# Patient Record
Sex: FEMALE | Race: WHITE | NOT HISPANIC OR LATINO | Employment: FULL TIME | URBAN - METROPOLITAN AREA
[De-identification: names, ages, dates, MRNs, and addresses within clinical notes are randomized per-mention and may not be internally consistent; named-entity substitution may affect disease eponyms.]

---

## 2018-02-08 ENCOUNTER — RESULT FOLLOW UP (OUTPATIENT)
Dept: OBGYN | Facility: CLINIC | Age: 43
End: 2018-02-08

## 2018-02-08 ENCOUNTER — OFFICE VISIT (OUTPATIENT)
Dept: OBGYN | Facility: CLINIC | Age: 43
End: 2018-02-08
Payer: COMMERCIAL

## 2018-02-08 VITALS
OXYGEN SATURATION: 100 % | HEIGHT: 63 IN | WEIGHT: 128.4 LBS | BODY MASS INDEX: 22.75 KG/M2 | SYSTOLIC BLOOD PRESSURE: 104 MMHG | DIASTOLIC BLOOD PRESSURE: 60 MMHG | HEART RATE: 69 BPM

## 2018-02-08 DIAGNOSIS — Z13.29 SCREENING FOR THYROID DISORDER: ICD-10-CM

## 2018-02-08 DIAGNOSIS — Z13.220 SCREENING FOR LIPOID DISORDERS: ICD-10-CM

## 2018-02-08 DIAGNOSIS — Z01.419 ENCOUNTER FOR GYNECOLOGICAL EXAMINATION WITHOUT ABNORMAL FINDING: Primary | ICD-10-CM

## 2018-02-08 DIAGNOSIS — Z13.1 SCREENING FOR DIABETES MELLITUS: ICD-10-CM

## 2018-02-08 PROCEDURE — 87624 HPV HI-RISK TYP POOLED RSLT: CPT | Performed by: OBSTETRICS & GYNECOLOGY

## 2018-02-08 PROCEDURE — 99386 PREV VISIT NEW AGE 40-64: CPT | Performed by: OBSTETRICS & GYNECOLOGY

## 2018-02-08 PROCEDURE — G0145 SCR C/V CYTO,THINLAYER,RESCR: HCPCS | Performed by: OBSTETRICS & GYNECOLOGY

## 2018-02-08 RX ORDER — SERTRALINE HYDROCHLORIDE 100 MG/1
100 TABLET, FILM COATED ORAL DAILY
Qty: 90 TABLET | Refills: 3 | COMMUNITY
Start: 2018-02-08 | End: 2022-07-29

## 2018-02-08 NOTE — LETTER
January 25, 2019      Itzel Atkins  [address] [facility] [address]    Dear ,      At [facility], your health and wellness is our primary concern. That is why we are following up on a positive high risk HPV test from 02/08/18. Your provider had recommended that you have a Pap smear and HPV test completed by 02/08/19. Our records do not show that this has been scheduled.    It is important to complete the follow up that your provider has suggested for you to ensure that there are no worsening changes which may, over time, develop into cancer.      Please contact our office at  [phone] to schedule an appointment for a Pap smear and HPV test at your earliest convenience. If you have questions or concerns, please call the clinic and we will be happy to assist you.    If you have completed the tests outside of [facility], please have the results forwarded to our office. We will update the chart for your primary Physician to review before your next annual physical.     Thank you for choosing [facility]!    Sincerely,      ARMINDA RON MD/[facility]

## 2018-02-08 NOTE — PROGRESS NOTES
Itzel is a 42 year old  female who presents for annual exam.     Menses are regular q 28-30 days and normal lasting 5 days.  Menses flow: normal.  Patient's last menstrual period was 2018.. Using vasectomy for contraception.  She is not currently considering pregnancy.  Besides routine health maintenance, she has no other health concerns today .  Kids are now 12.5, 7 and 5.6 y/o.  Doing well on zoloft 150 mg daily for last 18 months but does have libido issues with spouse.   GYNECOLOGIC HISTORY:    Itzel is sexually active with 1male partner(s) and is currently in monogamous relationship .    History sexually transmitted infections:No STD history  STI testing offered?  Declined  ILAN exposure: Unknown  History of abnormal Pap smear: NO - age 30- 65 PAP every 3 years recommended  Family history of breast CA: No  Family history of uterine/ovarian CA: No    Family history of colon CA: No    HEALTH MAINTENANCE:  Cholesterol: (  Cholesterol   Date Value Ref Range Status   2016 186 <200 mg/dL Final    History of abnormal lipids: No  Mammo: na . History of abnormal Mammo: Not applicable.  Regular Self Breast Exams: No  Calcium/Vitamin D intake: source:  dairy Adequate? Yes  TSH: (  TSH   Date Value Ref Range Status   06/15/2015 1.37 0.40 - 4.00 mU/L Final    )  Pap; (  Lab Results   Component Value Date    PAP NIL 10/28/2014    PAP NIL 2010    PAP NIL 2009    )    HISTORY:  Obstetric History       T3      L3     SAB0   TAB0   Ectopic0   Multiple0   Live Births3       # Outcome Date GA Lbr Nasir/2nd Weight Sex Delivery Anes PTL Lv   3 Term 08/15/12 39w1d 04:36 / 00:02 5 lb 10 oz (2.551 kg) F Vag-Spont None N AMANDA      Name: Jacqueline      Apgar1:  8                Apgar5: 9   2 Term 12/06/10 39w3d  7 lb 4 oz (3.289 kg) F  EPI  AMANDA      Name: Renita   1 Term 05 38w4d  7 lb 1 oz (3.204 kg) M    AMANDA      Name: Chris      Obstetric Comments   --Concieved spontanously after  "3+ cycles of clomid   2010--conceived with Letrazole step-up     Past Medical History:   Diagnosis Date     PCOS (polycystic ovarian syndrome)      Past Surgical History:   Procedure Laterality Date     D & C  8/2005    retained placental fragment, removed in ER     HC HYSTEROSALPINGOGRAM  3/5/10    normal     Family History   Problem Relation Age of Onset     Hypertension Father      Lipids Father      CANCER Maternal Grandmother      skin     OSTEOPOROSIS Maternal Grandmother      HEART DISEASE Paternal Grandfather      CEREBROVASCULAR DISEASE Paternal Grandfather      C.A.D. Paternal Grandfather      Allergies Sister      Social History     Social History     Marital status:      Spouse name: N/A     Number of children: 3     Years of education: N/A     Occupational History      [employer]     Social History Main Topics     Smoking status: Former Smoker     Types: Cigarettes     Quit date: 8/7/2000     Smokeless tobacco: Never Used     Alcohol use 0.0 - 1.2 oz/week     0 - 2 Standard drinks or equivalent per week      Comment: occ     Drug use: No     Sexual activity: Yes     Partners: Male     Birth control/ protection: None     Other Topics Concern     Parent/Sibling W/ Cabg, Mi Or Angioplasty Before 65f 55m? No       No current outpatient prescriptions on file.     Allergies   Allergen Reactions     Cats      No Known Drug Allergies        Past medical, surgical, social and family history were reviewed and updated in EPIC.    EXAM:  Pulse 69  Ht 5' 3\" (1.6 m)  Wt 128 lb 6.4 oz (58.2 kg)  LMP 01/08/2018  SpO2 100%  BMI 22.75 kg/m2   BMI: Body mass index is 22.75 kg/(m^2).  Constitutional: healthy, alert and no distress  Head: Normocephalic. No masses, lesions, tenderness or abnormalities  Neck: Neck supple. Trachea midline. No adenopathy. Thyroid symmetric, normal size.   Cardiovascular: RRR.   Respiratory: Negative.   Breast: Breasts reveal mild symmetric fibrocystic densities, but " there are no dominant, discrete, fixed or suspicious masses found.  Gastrointestinal: Abdomen soft, non-tender, non-distended. No masses, organomegaly.  :  Vulva:  No external lesions, normal female hair distribution, no inguinal adenopathy.    Urethra:  Midline, non-tender, well supported, no discharge  Vagina:  Moist, pink, no abnormal discharge, no lesions  Uterus:  Normal size , non-tender, freely mobile  Ovaries:  No masses appreciated, non-tender, mobile  Rectal Exam: deferred  Musculoskeletal: extremities normal  Skin: no suspicious lesions or rashes  Psychiatric: Affect appropriate, cooperative,mentation appears normal.     COUNSELING:   Reviewed preventive health counseling, as reflected in patient instructions   reports that she quit smoking about 17 years ago. Her smoking use included Cigarettes. She has never used smokeless tobacco.    Body mass index is 22.75 kg/(m^2).    FRAX Risk Assessment    ASSESSMENT:  42 year old female with satisfactory annual exam  (Z01.419) Encounter for gynecological examination without abnormal finding  (primary encounter diagnosis)  Comment: vasectomy  Plan: Pap imaged thin layer screen with HPV -         recommended age 30 - 65 years (select HPV order        below), HPV High Risk Types DNA Cervical, CBC         with platelets        Discussed sending pap smear for HPV typing as well and that if both pap and HPV are negative, then can have q5 year pap smears. Discussed mammograms if desires.    Discussed dose dependent relationship for some sexual issues on the SSRI and questions answered. May try to wean down with that provider.      (Z13.220) Screening for lipoid disorders  Plan: Lipid Profile        RTC fasting.    (Z13.29) Screening for thyroid disorder  Plan: TSH with free T4 reflex        RTC for lab    (Z13.1) Screening for diabetes mellitus  Plan: Comprehensive metabolic panel        RTC fasting.        Sandi Castillo MD

## 2018-02-08 NOTE — LETTER
February 16, 2018      Itzel Atkins  [address] [facility] [address]    Dear ,      This letter is in regards to the PAP smear and HPV (Human Papillomavirus) test you had done recently. Your PAP test result is normal, but your HPV (Human Papillomavirus) test was positive.     About 80 percent of women have been exposed to HPV virus throughout their lifetime. There is no medication for the treatment of HPV. Typically your own immune system gets rid of the virus before it does harm. HPV is spread by direct skin-to-skin contact, including sexual intercourse, oral sex, anal sex, or any other contact involving the genital area (example: hand to genital contact). It is not possible to become infected with HPV by touching an object, such as a toilet seat. Most people who are infected with HPV have no signs or symptoms.    Things that you can do to boost your immune system and help your body get rid of HPV: get plenty of rest, eat a well-balanced diet of healthy foods, and stop smoking.     Please return in 1 year to repeat your pap smear and HPV test.     If you have additional questions regarding this result, please call [phone].    Sincerely,      ARMINDA RON MD/[facility]

## 2018-02-08 NOTE — MR AVS SNAPSHOT
After Visit Summary   2/8/2018    Itzel Atkins    MRN: [id number]           Patient Information     Date Of Birth          1975        Visit Information        Provider Department      2/8/2018 2:00 PM Sandi Castillo MD [facility]        Today's Diagnoses     Encounter for gynecological examination without abnormal finding    -  1    Screening for lipoid disorders        Screening for thyroid disorder        Screening for diabetes mellitus          Care Instructions    Make lab only appointment fasting (nothing but water 10 hours prior to blood draw) at any [facility].            Follow-ups after your visit        Future tests that were ordered for you today     Open Future Orders        Priority Expected Expires Ordered    CBC with platelets Routine  8/9/2018 2/8/2018    Comprehensive metabolic panel Routine  8/9/2018 2/8/2018    TSH with free T4 reflex Routine  8/9/2018 2/8/2018    Lipid Profile Routine  8/9/2018 2/8/2018            Who to contact     If you have questions or need follow up information about today's clinic visit or your schedule please contact [facility] directly at [phone].  Normal or non-critical lab and imaging results will be communicated to you by [employer], letter or phone within 4 business days after the clinic has received the results. If you do not hear from us within 7 days, please contact the clinic through [employer] or phone. If you have a critical or abnormal lab result, we will notify you by phone as soon as possible.  Submit refill requests through [employer] or call your pharmacy and they will forward the refill request to us. Please allow 3 business days for your refill to be completed.          Additional Information About Your Visit        MyChart Information     [employer] gives you secure access to your electronic health record. If you see a primary care provider, you can also send messages to your care team and  "make appointments. If you have questions, please call your primary care clinic.  If you do not have a primary care provider, please call [phone] and they will assist you.        Care EveryWhere ID     This is your Care EveryWhere ID. This could be used by other organizations to access your [facility] medical records  [id number]        Your Vitals Were     Pulse Height Last Period Pulse Oximetry BMI (Body Mass Index)       69 5' 3\" (1.6 m) 01/08/2018 100% 22.75 kg/m2        Blood Pressure from Last 3 Encounters:   02/08/18 104/60   08/18/16 96/66   03/14/16 94/64    Weight from Last 3 Encounters:   02/08/18 128 lb 6.4 oz (58.2 kg)   08/18/16 122 lb (55.3 kg)   03/14/16 123 lb (55.8 kg)              We Performed the Following     HPV High Risk Types DNA Cervical     Pap imaged thin layer screen with HPV - recommended age 30 - 65 years (select HPV order below)        Primary Care Provider Office Phone # Fax #    Leydi Bliss [phone]       [facility] [address] [facility] [address]        Equal Access to Services     VANESSA STEPHENSON : Hadii kyra holto Sodaily, waaxda lugillianadaha, qaybta kaalmada niada, drake greenberg. So [facility] [phone].    ATENCIÓN: Si habla español, tiene a velasquez disposición servicios gratuitos de asistencia lingüística. Llame al [phone].    We comply with applicable federal civil rights laws and [place] laws. We do not discriminate on the basis of race, color, national origin, age, disability, sex, sexual orientation, or gender identity.            Thank you!     Thank you for choosing [facility]  for your care. Our goal is always to provide you with excellent care. Hearing back from our patients is one way we can continue to improve our services. Please take a few minutes to complete the written survey that you may receive in the mail after your visit with us. Thank you!             Your " Updated Medication List - Protect others around you: Learn how to safely use, store and throw away your medicines at www.disposemymeds.org.          This list is accurate as of 2/8/18  2:23 PM.  Always use your most recent med list.                   Brand Name Dispense Instructions for use Diagnosis    sertraline 100 MG tablet    ZOLOFT    90 tablet    Take 1.5 tablets (150 mg) by mouth daily

## 2018-02-08 NOTE — PATIENT INSTRUCTIONS
Make lab only appointment fasting (nothing but water 10 hours prior to blood draw) at any [facility].

## 2018-02-13 LAB
COPATH REPORT: NORMAL
PAP: NORMAL

## 2018-02-15 PROBLEM — R87.810 CERVICAL HIGH RISK HPV (HUMAN PAPILLOMAVIRUS) TEST POSITIVE: Status: ACTIVE | Noted: 2018-02-08

## 2018-02-15 LAB
FINAL DIAGNOSIS: ABNORMAL
HPV HR 12 DNA CVX QL NAA+PROBE: POSITIVE
HPV16 DNA SPEC QL NAA+PROBE: NEGATIVE
HPV18 DNA SPEC QL NAA+PROBE: NEGATIVE
SPECIMEN DESCRIPTION: ABNORMAL
SPECIMEN SOURCE CVX/VAG CYTO: ABNORMAL

## 2018-02-15 NOTE — PROGRESS NOTES
02/08/18: NIL pap, + HR HPV (not 16 or 18) result. Plan cotest in 1 year. Pt was advised.  02/16/18 Result letter sent at request of RN. ([facility])   01/25/19 Cotest reminder letter sent. ([facility])  3/19/19 NIL pap, neg HPV. Plan: cotest 3 years ([facility])

## 2018-11-14 ENCOUNTER — OFFICE VISIT (OUTPATIENT)
Dept: FAMILY MEDICINE | Facility: CLINIC | Age: 43
End: 2018-11-14
Payer: COMMERCIAL

## 2018-11-14 VITALS
HEART RATE: 61 BPM | BODY MASS INDEX: 22.5 KG/M2 | DIASTOLIC BLOOD PRESSURE: 74 MMHG | SYSTOLIC BLOOD PRESSURE: 103 MMHG | TEMPERATURE: 97 F | WEIGHT: 127 LBS | OXYGEN SATURATION: 99 % | HEIGHT: 63 IN

## 2018-11-14 DIAGNOSIS — Z13.21 ENCOUNTER FOR VITAMIN DEFICIENCY SCREENING: ICD-10-CM

## 2018-11-14 DIAGNOSIS — E28.2 POLYCYSTIC OVARIES: ICD-10-CM

## 2018-11-14 DIAGNOSIS — Z13.0 SCREENING, IRON DEFICIENCY ANEMIA: ICD-10-CM

## 2018-11-14 DIAGNOSIS — L65.9 ALOPECIA: Primary | ICD-10-CM

## 2018-11-14 DIAGNOSIS — Z13.29 SCREENING FOR THYROID DISORDER: ICD-10-CM

## 2018-11-14 LAB
DEPRECATED CALCIDIOL+CALCIFEROL SERPL-MC: 24 UG/L (ref 20–75)
FERRITIN SERPL-MCNC: 51 NG/ML (ref 12–150)
FOLATE SERPL-MCNC: 20.8 NG/ML
HGB BLD-MCNC: 13.9 G/DL (ref 11.7–15.7)
IRON SATN MFR SERPL: 25 % (ref 15–46)
IRON SERPL-MCNC: 89 UG/DL (ref 35–180)
TIBC SERPL-MCNC: 353 UG/DL (ref 240–430)
TSH SERPL DL<=0.005 MIU/L-ACNC: 2.09 MU/L (ref 0.4–4)
VIT B12 SERPL-MCNC: 523 PG/ML (ref 193–986)

## 2018-11-14 PROCEDURE — 82728 ASSAY OF FERRITIN: CPT | Performed by: INTERNAL MEDICINE

## 2018-11-14 PROCEDURE — 99214 OFFICE O/P EST MOD 30 MIN: CPT | Performed by: INTERNAL MEDICINE

## 2018-11-14 PROCEDURE — 83540 ASSAY OF IRON: CPT | Performed by: INTERNAL MEDICINE

## 2018-11-14 PROCEDURE — 82746 ASSAY OF FOLIC ACID SERUM: CPT | Performed by: INTERNAL MEDICINE

## 2018-11-14 PROCEDURE — 36415 COLL VENOUS BLD VENIPUNCTURE: CPT | Performed by: INTERNAL MEDICINE

## 2018-11-14 PROCEDURE — 83550 IRON BINDING TEST: CPT | Performed by: INTERNAL MEDICINE

## 2018-11-14 PROCEDURE — 82306 VITAMIN D 25 HYDROXY: CPT | Performed by: INTERNAL MEDICINE

## 2018-11-14 PROCEDURE — 82607 VITAMIN B-12: CPT | Performed by: INTERNAL MEDICINE

## 2018-11-14 PROCEDURE — 84443 ASSAY THYROID STIM HORMONE: CPT | Performed by: INTERNAL MEDICINE

## 2018-11-14 PROCEDURE — 85018 HEMOGLOBIN: CPT | Performed by: INTERNAL MEDICINE

## 2018-11-14 RX ORDER — CLOBETASOL PROPIONATE 0.5 MG/ML
SOLUTION TOPICAL
Refills: 2 | COMMUNITY
Start: 2018-10-04 | End: 2019-03-19

## 2018-11-14 RX ORDER — HYDROXYZINE HYDROCHLORIDE 10 MG/1
TABLET, FILM COATED ORAL
Refills: 1 | COMMUNITY
Start: 2018-10-30 | End: 2022-04-28

## 2018-11-14 RX ORDER — FLUOCINONIDE 0.5 MG/G
CREAM TOPICAL
Refills: 0 | COMMUNITY
Start: 2018-11-07

## 2018-11-14 ASSESSMENT — ANXIETY QUESTIONNAIRES
5. BEING SO RESTLESS THAT IT IS HARD TO SIT STILL: NOT AT ALL
2. NOT BEING ABLE TO STOP OR CONTROL WORRYING: NOT AT ALL
3. WORRYING TOO MUCH ABOUT DIFFERENT THINGS: SEVERAL DAYS
7. FEELING AFRAID AS IF SOMETHING AWFUL MIGHT HAPPEN: NOT AT ALL
6. BECOMING EASILY ANNOYED OR IRRITABLE: SEVERAL DAYS
IF YOU CHECKED OFF ANY PROBLEMS ON THIS QUESTIONNAIRE, HOW DIFFICULT HAVE THESE PROBLEMS MADE IT FOR YOU TO DO YOUR WORK, TAKE CARE OF THINGS AT HOME, OR GET ALONG WITH OTHER PEOPLE: NOT DIFFICULT AT ALL
1. FEELING NERVOUS, ANXIOUS, OR ON EDGE: SEVERAL DAYS
GAD7 TOTAL SCORE: 4

## 2018-11-14 ASSESSMENT — PATIENT HEALTH QUESTIONNAIRE - PHQ9: 5. POOR APPETITE OR OVEREATING: SEVERAL DAYS

## 2018-11-14 NOTE — PROGRESS NOTES
SUBJECTIVE:   Itzel Atkins is a 43 year old female who presents to clinic today for the following health issues:      New Patient/Transfer of Care  Alopecia    HPI:   Patient Itzel Atkins is a very pleasant 43 year old female with history of PCOS, chronic alopecia of unclear etiology who presents to Internal Medicine clinic today for evaluation of chronic poorly controlled alopecia symptoms. Regarding the patient's chronic alopecia, the patient is due for screening for iron deficiency, thyroid disorder and vitamin deficiencies. Patient is also interested in an evaluation by outpatient dermatology specialist clinic going forward at the [facility]. Regarding the patient's PCOS, the patient is currently followed by outpatient Ob/Gyn clinic. Patient denies any chest pain, headaches, fever or chills.         Current Medications:     Current Outpatient Prescriptions   Medication Sig Dispense Refill     clobetasol (TEMOVATE) 0.05 % external solution APPLY TO AFFECTED AREA ON SCALP TWICE A DAY  2     fluocinonide (LIDEX) 0.05 % cream APPLY TWICE DAILY TO ECZEMA. AVOID FACE AND GROIN  0     hydrOXYzine (ATARAX) 10 MG tablet TAKE 1 TO 4 TABLETS BY MOUTH AT BEDTIME AS NEEDED ITCH  1     sertraline (ZOLOFT) 100 MG tablet Take 100 mg by mouth daily  90 tablet 3         Allergies:      Allergies   Allergen Reactions     Cats      No Known Drug Allergies             Past Medical History:     Past Medical History:   Diagnosis Date     Cervical high risk HPV (human papillomavirus) test positive 02/08/2018 02/08/18: NIL pap, + HR HPV (not 16 or 18) result.      PCOS (polycystic ovarian syndrome)          Past Surgical History:     Past Surgical History:   Procedure Laterality Date     D & C  8/2005    retained placental fragment, removed in ER     HC HYSTEROSALPINGOGRAM  3/5/10    normal         Family Medical History:     Family History   Problem Relation Age of Onset     Hypertension Father      Lipids  Father      Cancer Maternal Grandmother      skin     Osteoporosis Maternal Grandmother      HEART DISEASE Paternal Grandfather      Cerebrovascular Disease Paternal Grandfather      C.A.D. Paternal Grandfather      Allergies Sister          Social History:     Social History     Social History     Marital status:      Spouse name: N/A     Number of children: 3     Years of education: N/A     Occupational History      [employer]     Social History Main Topics     Smoking status: Former Smoker     Types: Cigarettes     Quit date: 8/7/2000     Smokeless tobacco: Never Used     Alcohol use 0.0 - 1.2 oz/week     0 - 2 Standard drinks or equivalent per week      Comment: occ     Drug use: No     Sexual activity: Yes     Partners: Male      Comment: vasectomy     Other Topics Concern     Parent/Sibling W/ Cabg, Mi Or Angioplasty Before 65f 55m? No     Social History Narrative    Caffeine intake/servings daily - 2    Calcium intake/servings daily - 4-4    Exercise 2-3 times weekly - describe running    Sunscreen used - No    Seatbelts used - Yes    Guns stored in the home - No    Self Breast Exam - No    Pap test up to date -  No    Eye exam up to date -  No    Dental exam up to date -  Yes    DEXA scan up to date -  Not Applicable    Flex Sig/Colonoscopy up to date -  Not Applicable    Mammography up to date -  Not Applicable    Immunizations reviewed and up to date - No    Abuse: Current or Past (Physical, Sexual or Emotional) - No    Do you feel safe in your environment - Yes    Do you cope well with stress - Yes    Do you suffer from insomnia - sometimes    Last updated by: KARIN MORLEY  1/19/2012                                           Review of System:     Constitutional: Negative for fever or chills  Skin: Positive for alopecia  Ears/Nose/Throat: Negative for nasal congestion, sore throat  Respiratory: No shortness of breath, dyspnea on exertion, cough, or hemoptysis  Cardiovascular:  "Negative for chest pain  Gastrointestinal: Negative for nausea, vomiting  Genitourinary: Negative for dysuria, hematuria  Musculoskeletal: Negative for myalgias  Neurologic: Negative for headaches  Psychiatric: Negative for depression, anxiety  Hematologic/Lymphatic/Immunologic: Negative  Endocrine: Positive for chronic PCOS  Behavioral: Negative for tobacco use       Physical Exam:   /74 (BP Location: Left arm, Cuff Size: Adult Regular)  Pulse 61  Temp 97  F (36.1  C) (Oral)  Ht 5' 3\" (1.6 m)  Wt 127 lb (57.6 kg)  LMP 10/22/2018 (Approximate)  SpO2 99%  BMI 22.5 kg/m2    GENERAL: alert and no distress  EYES: eyes grossly normal to inspection, and conjunctivae and sclerae normal  HENT: Normocephalic atraumatic. Nose and mouth without ulcers or lesions  NECK: supple  RESP: lungs clear to auscultation   CV: regular rate and rhythm, normal S1 S2  LYMPH: no peripheral edema   ABDOMEN: nondistended  MS: no gross musculoskeletal defects noted  SKIN: hair loss symptoms present  NEURO: Alert & Oriented x 3.   PSYCH: mentation appears normal, affect normal        Diagnostic Test Results:     Diagnostic Test Results:  Results for orders placed or performed in visit on 11/14/18   Hemoglobin   Result Value Ref Range    Hemoglobin 13.9 11.7 - 15.7 g/dL       ASSESSMENT/PLAN:       (L65.9) Alopecia  (primary encounter diagnosis)  Comment: chronic alopecia of unclear etiolgoy  Plan: DERMATOLOGY REFERRAL    (Z13.29) Screening for thyroid disorder  Comment: patient is due for screening for thyroid disorder with chronic alopecia  Plan: TSH with free T4 reflex      (Z13.21) Encounter for vitamin deficiency screening  Comment:  patient is due for screening for vitamin deficiency with chronic alopecia  Plan: Vitamin D Deficiency, Vitamin B12, Folate      (Z13.0) Screening, iron deficiency anemia  Comment: patient is due for screening for iron deficiency with chronic alopecia  Plan: Iron and iron binding capacity, Ferritin, " Hemoglobin      (E28.2) Polycystic ovaries  Comment: chronic PCOS  Plan: patient is advised to continue her follow up with Ob/gyn clinic going forward.        Follow Up Plan:     Patient is instructed to return to Internal Medicine clinic for follow-up visit in 1 month.      Amy Valenzuela MD  Internal Medicine  [facility]

## 2018-11-14 NOTE — LETTER
[facility]  [address] [facility]  [address]  [place]  [address]  Tel: [phone]    November 15, 2018    Itzel Atkins  [address] [address]        Dear Ms. Atkins,    Your labs are enclosed.    If you have any further questions or problems, please contact our office.      Sincerely,    Lizandro Valenzuela MD/ Hayley Mathews CMA  Results for orders placed or performed in visit on 11/14/18   TSH with free T4 reflex   Result Value Ref Range    TSH 2.09 0.40 - 4.00 mU/L   Iron and iron binding capacity   Result Value Ref Range    Iron 89 35 - 180 ug/dL    Iron Binding Cap 353 240 - 430 ug/dL    Iron Saturation Index 25 15 - 46 %   Ferritin   Result Value Ref Range    Ferritin 51 12 - 150 ng/mL   Hemoglobin   Result Value Ref Range    Hemoglobin 13.9 11.7 - 15.7 g/dL   Vitamin D Deficiency   Result Value Ref Range    Vitamin D Deficiency screening 24 20 - 75 ug/L   Vitamin B12   Result Value Ref Range    Vitamin B12 523 193 - 986 pg/mL   Folate   Result Value Ref Range    Folate 20.8 >5.4 ng/mL               Enclosure: Lab Results

## 2018-11-14 NOTE — MR AVS SNAPSHOT
After Visit Summary   11/14/2018    Itzel Atkins    MRN: [id number]           Patient Information     Date Of Birth          1975        Visit Information        Provider Department      11/14/2018 8:40 AM Amy Valenzuela MD [facility]        Today's Diagnoses     Alopecia    -  1    Screening for thyroid disorder        Encounter for vitamin deficiency screening        Screening, iron deficiency anemia        Polycystic ovaries           Follow-ups after your visit        Additional Services     DERMATOLOGY REFERRAL       Your provider has referred you to: [facility]: Dermatology Clinic [facility] [phone]   [email or web address]     Please be aware that coverage of these services is subject to the terms and limitations of your health insurance plan.  Call member services at your health plan with any benefit or coverage questions.      Please bring the following with you to your appointment:    (1) Any X-Rays, CTs or MRIs which have been performed.  Contact the facility where they were done to arrange for  prior to your scheduled appointment.    (2) List of current medications  (3) This referral request   (4) Any documents/labs given to you for this referral                  Follow-up notes from your care team     Return in about 1 month (around 12/14/2018).      Who to contact     If you have questions or need follow up information about today's clinic visit or your schedule please contact [facility] directly at [phone].  Normal or non-critical lab and imaging results will be communicated to you by MyChart, letter or phone within 4 business days after the clinic has received the results. If you do not hear from us within 7 days, please contact the clinic through MyChart or phone. If you have a critical or abnormal lab result, we will notify you by phone as soon as possible.  Submit refill requests through [employer] or  "call your pharmacy and they will forward the refill request to us. Please allow 3 business days for your refill to be completed.          Additional Information About Your Visit        MyChart Information     [employer] gives you secure access to your electronic health record. If you see a primary care provider, you can also send messages to your care team and make appointments. If you have questions, please call your primary care clinic.  If you do not have a primary care provider, please call [phone] and they will assist you.        Care EveryWhere ID     This is your Care EveryWhere ID. This could be used by other organizations to access your [place] medical records  [id number]        Your Vitals Were     Pulse Temperature Height Last Period Pulse Oximetry BMI (Body Mass Index)    61 97  F (36.1  C) (Oral) 5' 3\" (1.6 m) 10/22/2018 (Approximate) 99% 22.5 kg/m2       Blood Pressure from Last 3 Encounters:   11/14/18 103/74   02/08/18 104/60   08/18/16 96/66    Weight from Last 3 Encounters:   11/14/18 127 lb (57.6 kg)   02/08/18 128 lb 6.4 oz (58.2 kg)   08/18/16 122 lb (55.3 kg)              We Performed the Following     DERMATOLOGY REFERRAL     Ferritin     Folate     Hemoglobin     Iron and iron binding capacity     TSH with free T4 reflex     Vitamin B12     Vitamin D Deficiency        Primary Care Provider Office Phone # Fax #    Amy Lizandro Valenzuela [phone] [address]  [place] [address]        Equal Access to Services     WESLY [facility] : Hadii aad ku hadasho Soomaali, waaxda luqadaha, qaybta kaalmada adeegyada, drake zacarias . So [facility] [phone].    ATENCIÓN: Si habla gibsonañol, tiene a velasquez disposición servicios gratuitos de asistencia lingüística. Llame al [phone].    We comply with applicable federal civil rights laws and [place] laws. We do not discriminate on the basis of race, color, national origin, age, disability, sex, sexual " orientation, or gender identity.            Thank you!     Thank you for choosing [facility]  for your care. Our goal is always to provide you with excellent care. Hearing back from our patients is one way we can continue to improve our services. Please take a few minutes to complete the written survey that you may receive in the mail after your visit with us. Thank you!             Your Updated Medication List - Protect others around you: Learn how to safely use, store and throw away your medicines at www.disposemymeds.org.          This list is accurate as of 11/14/18 10:08 AM.  Always use your most recent med list.                   Brand Name Dispense Instructions for use Diagnosis    clobetasol 0.05 % external solution    TEMOVATE     APPLY TO AFFECTED AREA ON SCALP TWICE A DAY        fluocinonide 0.05 % cream    LIDEX     APPLY TWICE DAILY TO ECZEMA. AVOID FACE AND GROIN        hydrOXYzine 10 MG tablet    ATARAX     TAKE 1 TO 4 TABLETS BY MOUTH AT BEDTIME AS NEEDED ITCH        sertraline 100 MG tablet    ZOLOFT    90 tablet    Take 100 mg by mouth daily

## 2018-11-15 ENCOUNTER — TELEPHONE (OUTPATIENT)
Dept: DERMATOLOGY | Facility: CLINIC | Age: 43
End: 2018-11-15

## 2018-11-15 ASSESSMENT — ANXIETY QUESTIONNAIRES: GAD7 TOTAL SCORE: 4

## 2018-11-15 NOTE — TELEPHONE ENCOUNTER
I attempted to call Itzel, she didn't answer and I was unable to leave a VM due to her mailbox being full. I will route to [facility] to give Itzel a call to go over her referral process

## 2018-11-15 NOTE — TELEPHONE ENCOUNTER
Health Call Center    Phone Message    May a detailed message be left on voicemail: yes    Reason for Call: Other: Pt has new internal referral for hairloss, she is requesting Dr Gonzalez. She does see an outside dermatologist for the same reason but we do not have records as of yet. Please reach out to pt to discuss     Action Taken: Message routed to:  [facility] derm

## 2018-11-29 NOTE — TELEPHONE ENCOUNTER
Writer spoke with patient letting her know that a referral from a dermatologist and last office visit note are needed to initiate Dr. Gonzalez's referral process. We also request, if applicable, lab results and any scalp biopsy results. Writer also noted that the review process should take two to four weeks and we are currently scheduling into March 2019. Writer gave patient the dermatology fax number for sending records. Patient voiced understanding.

## 2019-03-19 ENCOUNTER — OFFICE VISIT (OUTPATIENT)
Dept: OBGYN | Facility: CLINIC | Age: 44
End: 2019-03-19
Payer: COMMERCIAL

## 2019-03-19 VITALS
BODY MASS INDEX: 22.85 KG/M2 | DIASTOLIC BLOOD PRESSURE: 71 MMHG | TEMPERATURE: 97.5 F | WEIGHT: 129 LBS | SYSTOLIC BLOOD PRESSURE: 105 MMHG | HEART RATE: 67 BPM

## 2019-03-19 DIAGNOSIS — Z12.31 VISIT FOR SCREENING MAMMOGRAM: ICD-10-CM

## 2019-03-19 DIAGNOSIS — Z00.00 ANNUAL PHYSICAL EXAM: Primary | ICD-10-CM

## 2019-03-19 PROCEDURE — 87624 HPV HI-RISK TYP POOLED RSLT: CPT | Performed by: OBSTETRICS & GYNECOLOGY

## 2019-03-19 PROCEDURE — 99396 PREV VISIT EST AGE 40-64: CPT | Performed by: OBSTETRICS & GYNECOLOGY

## 2019-03-19 PROCEDURE — 88175 CYTOPATH C/V AUTO FLUID REDO: CPT | Performed by: OBSTETRICS & GYNECOLOGY

## 2019-03-19 NOTE — NURSING NOTE
"Chief Complaint   Patient presents with     Physical       Initial /71 (BP Location: Left arm, Patient Position: Sitting, Cuff Size: Adult Regular)   Pulse 67   Temp 97.5  F (36.4  C) (Oral)   Wt 58.5 kg (129 lb)   LMP 2019   BMI 22.85 kg/m   Estimated body mass index is 22.85 kg/m  as calculated from the following:    Height as of 18: 1.6 m (5' 3\").    Weight as of this encounter: 58.5 kg (129 lb).  BP completed using cuff size: regular    Questioned patient about current smoking habits.  Pt. Quit some time ago.          The following HM Due: pap smear      The following patient reported/Care Every where data was sent to:  P ABSTRACT QUALITY INITIATIVES [23996]  TUCKER Donato           "

## 2019-03-19 NOTE — PROGRESS NOTES
Itzel is a 43 year old  female who presents for annual exam.     Menses are regular q 28-30 days and normal lasting 5 days.  Menses flow: normal.  Patient's last menstrual period was 2019.. Using vasectomy for contraception.  She is not currently considering pregnancy.  Besides routine health maintenance, she has no other health concerns today .  Has noticed dry skin around her areola, which she thinks is related to her eczema. Has been present for last 1-2 years, but worse in last few months.  Moisturizes daily, but doesn't drink much water.  GYNECOLOGIC HISTORY:  Menarche:     Itzel is sexually active with 1 male partner(s) and is currently in monogamous relationship with .    History sexually transmitted infections:No STD history  STI testing offered?  Declined  ILAN exposure: No  History of abnormal Pap smear: NO - age 30- 65 PAP every 3 years recommended  Family history of breast CA: No  Family history of uterine/ovarian CA: No    Family history of colon CA: No    HEALTH MAINTENANCE:  Cholesterol: (  Cholesterol   Date Value Ref Range Status   2016 186 <200 mg/dL Final    History of abnormal lipids: No  Mammo: 0 . History of abnormal Mammo: Not applicable, 0.  Regular Self Breast Exams: No  Calcium/Vitamin D intake: source:  dairy, dietary supplement(s) Adequate? Yes  TSH: (  TSH   Date Value Ref Range Status   2018 2.09 0.40 - 4.00 mU/L Final    )  Pap; (  Lab Results   Component Value Date    PAP NIL 2018    PAP NIL 10/28/2014    PAP NIL 2010    )    HISTORY:  Obstetric History       T3      L3     SAB0   TAB0   Ectopic0   Multiple0   Live Births3       # Outcome Date GA Lbr Nasir/2nd Weight Sex Delivery Anes PTL Lv   3 Term 08/15/12 39w1d 04:36 / 00:02 2.551 kg (5 lb 10 oz) F Vag-Spont None N AMANDA      Name: Jacqueline      Apgar1:  8                Apgar5: 9   2 Term 12/06/10 39w3d  3.289 kg (7 lb 4 oz) F  EPI  AMANDA      Name: Renita   1 Term 05 38w4d   3.204 kg (7 lb 1 oz) M    AMANDA      Name: Chris      Obstetric Comments   --Concieved spontanously after 3+ cycles of clomid   --conceived with Letrazole step-up     Past Medical History:   Diagnosis Date     Cervical high risk HPV (human papillomavirus) test positive 2018: NIL pap, + HR HPV (not 16 or 18) result.      PCOS (polycystic ovarian syndrome)      Past Surgical History:   Procedure Laterality Date     D & C  2005    retained placental fragment, removed in ER     HC HYSTEROSALPINGOGRAM  3/5/10    normal     Family History   Problem Relation Age of Onset     Hypertension Father      Lipids Father      Cancer Maternal Grandmother         skin     Osteoporosis Maternal Grandmother      Heart Disease Paternal Grandfather      Cerebrovascular Disease Paternal Grandfather      C.A.D. Paternal Grandfather      Allergies Sister      Social History     Socioeconomic History     Marital status:      Spouse name: None     Number of children: 3     Years of education: None     Highest education level: None   Occupational History     Employer: [employer]   Social Needs     Financial resource strain: None     Food insecurity:     Worry: None     Inability: None     Transportation needs:     Medical: None     Non-medical: None   Tobacco Use     Smoking status: Former Smoker     Types: Cigarettes     Last attempt to quit: 2000     Years since quittin.6     Smokeless tobacco: Never Used   Substance and Sexual Activity     Alcohol use: Yes     Alcohol/week: 0.0 - 1.2 oz     Comment: occ     Drug use: No     Sexual activity: Yes     Partners: Male     Comment: vasectomy   Lifestyle     Physical activity:     Days per week: None     Minutes per session: None     Stress: None   Relationships     Social connections:     Talks on phone: None     Gets together: None     Attends Confucianism service: None     Active member of club or organization: None     Attends meetings  of clubs or organizations: None     Relationship status: None     Intimate partner violence:     Fear of current or ex partner: None     Emotionally abused: None     Physically abused: None     Forced sexual activity: None   Other Topics Concern     Parent/sibling w/ CABG, MI or angioplasty before 65F 55M? No   Social History Narrative    Caffeine intake/servings daily - 2    Calcium intake/servings daily - 4-4    Exercise 2-3 times weekly - describe running    Sunscreen used - No    Seatbelts used - Yes    Guns stored in the home - No    Self Breast Exam - No    Pap test up to date -  No    Eye exam up to date -  No    Dental exam up to date -  Yes    DEXA scan up to date -  Not Applicable    Flex Sig/Colonoscopy up to date -  Not Applicable    Mammography up to date -  Not Applicable    Immunizations reviewed and up to date - No    Abuse: Current or Past (Physical, Sexual or Emotional) - No    Do you feel safe in your environment - Yes    Do you cope well with stress - Yes    Do you suffer from insomnia - sometimes    Last updated by: KARIN MORLEY  1/19/2012                                       Current Outpatient Medications:      fluocinonide (LIDEX) 0.05 % cream, APPLY TWICE DAILY TO ECZEMA. AVOID FACE AND GROIN, Disp: , Rfl: 0     hydrOXYzine (ATARAX) 10 MG tablet, TAKE 1 TO 4 TABLETS BY MOUTH AT BEDTIME AS NEEDED ITCH, Disp: , Rfl: 1     sertraline (ZOLOFT) 100 MG tablet, Take 100 mg by mouth daily , Disp: 90 tablet, Rfl: 3     Allergies   Allergen Reactions     Cats      No Known Drug Allergies        Past medical, surgical, social and family history were reviewed and updated in EPIC.    ROS:   C:     NEGATIVE for fever, chills, change in weight  I:       NEGATIVE for worrisome rashes, moles or lesions  E:     NEGATIVE for vision changes or irritation  E/M: NEGATIVE for ear, mouth and throat problems  R:     NEGATIVE for significant cough or SOB  CV:   NEGATIVE for chest pain, palpitations or peripheral  edema  GI:     NEGATIVE for nausea, abdominal pain, heartburn, or change in bowel habits  :   NEGATIVE for frequency, dysuria, hematuria, vaginal discharge, or irregular bleeding  M:     NEGATIVE for significant arthralgias or myalgia  N:      NEGATIVE for weakness, dizziness or paresthesias  E:      NEGATIVE for temperature intolerance, skin/hair changes  P:      NEGATIVE for changes in mood or affect.    EXAM:  /71 (BP Location: Left arm, Patient Position: Sitting, Cuff Size: Adult Regular)   Pulse 67   Temp 97.5  F (36.4  C) (Oral)   Wt 58.5 kg (129 lb)   LMP 03/07/2019   BMI 22.85 kg/m     BMI: Body mass index is 22.85 kg/m .  Constitutional: healthy, alert and no distress  Head: Normocephalic. No masses, lesions, tenderness or abnormalities  Neck: Neck supple. Trachea midline. No adenopathy. Thyroid symmetric, normal size.   Cardiovascular: RRR.   Respiratory: Negative.   Breast: No nodularity, asymmetry or nipple discharge bilaterally.  Dry skin and excoriations around areola bilaterally.  Gastrointestinal: Abdomen soft, non-tender, non-distended. No masses, organomegaly.  :  Vulva:  No external lesions, normal female hair distribution, no inguinal adenopathy.    Urethra:  Midline, non-tender, well supported, no discharge  Vagina:  Moist, pink, no abnormal discharge, no lesions  Uterus:  Normal size, anteverted , non-tender, freely mobile  Ovaries:  No masses appreciated, non-tender, mobile  Rectal Exam: deferred  Musculoskeletal: extremities normal  Skin: no suspicious lesions or rashes  Psychiatric: Affect appropriate, cooperative,mentation appears normal.     COUNSELING:   Reviewed preventive health counseling, as reflected in patient instructions       (Monik)menopause management       pap testing, HPV infections   reports that she quit smoking about 18 years ago. Her smoking use included cigarettes. she has never used smokeless tobacco.    Body mass index is 22.85 kg/m .    FRAX Risk  Assessment    ASSESSMENT:  43 year old female with satisfactory annual exam  (Z00.00) Annual physical exam  (primary encounter diagnosis)  Comment: Reviewed natural history of HPV infection and increased risk of cervical cancer.  Reviewed need for coloscopy if this year's pap shows same results as last year.  Recommended increased water intake and Aquaphor on dry skin at night  Plan: Pap imaged thin layer screen with HPV -         recommended age 30 - 65 years (select HPV order        below), HPV High Risk Types DNA Cervical            (Z12.31) Visit for screening mammogram  Comment: normal breast exam except for dry skin.  Plan: MA Screening Digital Bilateral          Leslie Negro MD

## 2019-03-21 LAB
COPATH REPORT: NORMAL
PAP: NORMAL

## 2019-03-25 LAB
FINAL DIAGNOSIS: NORMAL
HPV HR 12 DNA CVX QL NAA+PROBE: NEGATIVE
HPV16 DNA SPEC QL NAA+PROBE: NEGATIVE
HPV18 DNA SPEC QL NAA+PROBE: NEGATIVE
SPECIMEN DESCRIPTION: NORMAL
SPECIMEN SOURCE CVX/VAG CYTO: NORMAL

## 2019-12-19 ENCOUNTER — OFFICE VISIT (OUTPATIENT)
Dept: FAMILY MEDICINE | Facility: CLINIC | Age: 44
End: 2019-12-19
Payer: COMMERCIAL

## 2019-12-19 VITALS
RESPIRATION RATE: 16 BRPM | SYSTOLIC BLOOD PRESSURE: 116 MMHG | WEIGHT: 133 LBS | OXYGEN SATURATION: 98 % | BODY MASS INDEX: 23.56 KG/M2 | TEMPERATURE: 97.5 F | HEART RATE: 61 BPM | DIASTOLIC BLOOD PRESSURE: 68 MMHG

## 2019-12-19 DIAGNOSIS — R05.9 COUGH: ICD-10-CM

## 2019-12-19 DIAGNOSIS — Z20.828 EXPOSURE TO INFLUENZA: Primary | ICD-10-CM

## 2019-12-19 LAB
FLUAV+FLUBV AG SPEC QL: NEGATIVE
FLUAV+FLUBV AG SPEC QL: NEGATIVE
SPECIMEN SOURCE: NORMAL

## 2019-12-19 PROCEDURE — 99203 OFFICE O/P NEW LOW 30 MIN: CPT | Performed by: PHYSICIAN ASSISTANT

## 2019-12-19 PROCEDURE — 87804 INFLUENZA ASSAY W/OPTIC: CPT | Performed by: PHYSICIAN ASSISTANT

## 2019-12-19 RX ORDER — BENZONATATE 200 MG/1
200 CAPSULE ORAL 3 TIMES DAILY PRN
Qty: 30 CAPSULE | Refills: 1 | Status: SHIPPED | OUTPATIENT
Start: 2019-12-19 | End: 2022-04-28

## 2019-12-19 RX ORDER — OSELTAMIVIR PHOSPHATE 75 MG/1
75 CAPSULE ORAL DAILY
Qty: 10 CAPSULE | Refills: 0 | Status: SHIPPED | OUTPATIENT
Start: 2019-12-19 | End: 2020-12-30

## 2019-12-19 RX ORDER — OSELTAMIVIR PHOSPHATE 75 MG/1
75 CAPSULE ORAL 2 TIMES DAILY
Qty: 10 CAPSULE | Refills: 0 | Status: CANCELLED | OUTPATIENT
Start: 2019-12-19

## 2019-12-19 NOTE — PROGRESS NOTES
Subjective     Itzel Atkins is a 44 year old female who presents to clinic today for the following health issues:    HPI   RESPIRATORY SYMPTOMS      Duration: 10 days    Description  cough about a week ago and fatigue/malaise, facial pain and congestion x 1 day significant worsening     Severity: mild    Accompanying signs and symptoms: None    History (predisposing factors):  2 daughters have influenza b    Precipitating or alleviating factors: None    Therapies tried and outcome:  none  Reviewed and updated as needed this visit by Provider  Tobacco  Allergies  Meds  Problems  Med Hx  Surg Hx  Fam Hx  Soc Hx          Additional complaints: None    HPI additional notes: Itzel presents today with   Chief Complaint   Patient presents with     Cough   First daughter was positive last Monday (12/9/19), second was this past Sunday (12/15/19).  No fever, muscles aches or SOB.  Cough has been wearing her out.  Started getting a headache today.  Notes significant fatigue.     Review of Systems   C: Negative for fever, chills, recent change in weight  Skin: Negative for worrisome rashes or lesions  ENT/MOUTH:POSITIVE for congestion, ear pressure and post-nasal drainage.  Resp: POSITIVE for cough non-productive without SOB and wheezing  MS: POSITIVE for generalized fatigue and malaise.  NEURO: Negative  for headaches or dizziness.  P: Negative for changes in mood or affect  ROS otherwise negative.    Chart Review:  History   Smoking Status     Former Smoker     Types: Cigarettes     Quit date: 8/7/2000   Smokeless Tobacco     Never Used     Patient Active Problem List   Diagnosis     Polycystic ovaries     Amenorrhea     Eczema     Cervical high risk HPV (human papillomavirus) test positive     Past Surgical History:   Procedure Laterality Date     D & C  8/2005    retained placental fragment, removed in ER     HC HYSTEROSALPINGOGRAM  3/5/10    normal     Problem list, Medication list, Allergies,  Medical/Social/Surg hx reviewed in EPIC, updated as appropriate.           Objective    /68   Pulse 61   Temp 97.5  F (36.4  C) (Tympanic)   Resp 16   Wt 60.3 kg (133 lb)   SpO2 98%   BMI 23.56 kg/m    Body mass index is 23.56 kg/m .  Physical Exam   GENERAL: healthy, alert, in no acute distress  EYES: Grossly normal to inspection, EOMI, PERRL  HENT: Ear canals normal bilaterally. TM dull gray  retracted with serous effusion bilaterally.  Nasal mucosa mildly edematous with clear rhinorrhea.  left septal deviation.  Mouth- mucous membranes moist, no lesions or ulcerations. Pharynx pink. and No tonsillary hypertrophy. Uvula midline, purulent post-nasal drainage.  Maxillary and frontal sinuses nontender to palpation bilaterally  NECK: Non-tender, no adenopathy.  RESP: lungs clear to auscultation - no rales, no rhonchi, no wheezes  CV: regular rate and rhythm, normal S1 S2.  No peripheral edema.  SKIN: no suspicious lesions, no rashes  PSYCH: Alert and oriented times 3;  Able to articulate logical thoughts. Affect is normal.    Diagnostic test results: Negative for influenza.        Assessment & Plan       ICD-10-CM    1. Exposure to influenza Z20.828 Influenza A/B antigen     oseltamivir (TAMIFLU) 75 MG capsule     benzonatate (TESSALON) 200 MG capsule   2. Cough R05 benzonatate (TESSALON) 200 MG capsule       Please see patient instructions for treatment details.    Return in about 2 weeks (around 1/2/2020) for Recheck if not improving.    Argenis Avalos PA-C  [facility]

## 2020-11-29 ENCOUNTER — HEALTH MAINTENANCE LETTER (OUTPATIENT)
Age: 45
End: 2020-11-29

## 2020-12-30 ENCOUNTER — OFFICE VISIT (OUTPATIENT)
Dept: OBGYN | Facility: CLINIC | Age: 45
End: 2020-12-30
Payer: COMMERCIAL

## 2020-12-30 DIAGNOSIS — Z01.419 ENCOUNTER FOR GYNECOLOGICAL EXAMINATION WITHOUT ABNORMAL FINDING: Primary | ICD-10-CM

## 2020-12-30 PROCEDURE — 99396 PREV VISIT EST AGE 40-64: CPT | Performed by: OBSTETRICS & GYNECOLOGY

## 2020-12-30 SDOH — HEALTH STABILITY: MENTAL HEALTH: HOW MANY STANDARD DRINKS CONTAINING ALCOHOL DO YOU HAVE ON A TYPICAL DAY?: 1 OR 2

## 2020-12-30 SDOH — HEALTH STABILITY: MENTAL HEALTH: HOW OFTEN DO YOU HAVE 6 OR MORE DRINKS ON ONE OCCASION?: NEVER

## 2020-12-30 SDOH — HEALTH STABILITY: MENTAL HEALTH: HOW OFTEN DO YOU HAVE A DRINK CONTAINING ALCOHOL?: 2-4 TIMES A MONTH

## 2020-12-30 ASSESSMENT — ANXIETY QUESTIONNAIRES
2. NOT BEING ABLE TO STOP OR CONTROL WORRYING: NOT AT ALL
5. BEING SO RESTLESS THAT IT IS HARD TO SIT STILL: NOT AT ALL
6. BECOMING EASILY ANNOYED OR IRRITABLE: SEVERAL DAYS
3. WORRYING TOO MUCH ABOUT DIFFERENT THINGS: SEVERAL DAYS
7. FEELING AFRAID AS IF SOMETHING AWFUL MIGHT HAPPEN: NOT AT ALL
GAD7 TOTAL SCORE: 3
1. FEELING NERVOUS, ANXIOUS, OR ON EDGE: SEVERAL DAYS

## 2020-12-30 ASSESSMENT — PATIENT HEALTH QUESTIONNAIRE - PHQ9
SUM OF ALL RESPONSES TO PHQ QUESTIONS 1-9: 2
5. POOR APPETITE OR OVEREATING: NOT AT ALL

## 2020-12-30 NOTE — PROGRESS NOTES
Phone visit for modified care during COVID.    Itzel is a 45 year old  female who presents for annual exam.     Menses are regular q 28-30 days and crampy the 1st 24hrs and normal lasting 4 days.  Menses flow: normal.  No LMP recorded.. Using vasectomy for contraception.  She is not currently considering pregnancy.  Besides routine health maintenance, she has no other health concerns today .  One of best friends was diagnosed with breast cancer so has mammogram scheduled for Monday. Kids are now son 15.6 y/o and girls are 10 and 7 y/o. Still taking zoloft and doing well.  GYNECOLOGIC HISTORY:  Menarche  Itzel is sexually active with 1male partner(s) and is currently in monogamous relationship.    History sexually transmitted infections:No STD history  STI testing offered?  Declined  ILAN exposure: Unknown  History of abnormal Pap smear: NO - age 30-65 PAP every 5 years with negative HPV co-testing recommended  Family history of breast CA: No  Family history of uterine/ovarian CA: No    Family history of colon CA: No    HEALTH MAINTENANCE:  Cholesterol: (  Cholesterol   Date Value Ref Range Status   2016 186 <200 mg/dL Final    History of abnormal lipids: No  Mammo: na . History of abnormal Mammo: Not applicable.  Regular Self Breast Exams: Yes  Calcium/Vitamin D intake: source:  dairy Adequate? Yes  TSH: (  TSH   Date Value Ref Range Status   2018 2.09 0.40 - 4.00 mU/L Final    )  Pap; (  Lab Results   Component Value Date    PAP NIL 2019    PAP NIL 2018    PAP NIL 10/28/2014    )    HISTORY:  OB History    Para Term  AB Living   3 3 3 0 0 3   SAB TAB Ectopic Multiple Live Births   0 0 0 0 3      # Outcome Date GA Lbr Nasir/2nd Weight Sex Delivery Anes PTL Lv   3 Term 08/15/12 39w1d 04:36 / 00:02 2.551 kg (5 lb 10 oz) F Vag-Spont None N AMANDA      Birth Comments: went very fast      Name: Jacqueline      Apgar1: 8  Apgar5: 9   2 Term 12/06/10 39w3d  3.289 kg (7 lb 4 oz) F  EPI   AMANDA      Name: Renita Jean Term 05 38w4d  3.204 kg (7 lb 1 oz) M    AMANDA      Birth Comments: retained placental fragment 4 days after birth      Name: Chris      Obstetric Comments   --Concieved spontanously after 3+ cycles of clomid   --conceived with Letrazole step-up     Past Medical History:   Diagnosis Date     Cervical high risk HPV (human papillomavirus) test positive 2018: NIL pap, + HR HPV (not 16 or 18) result.      PCOS (polycystic ovarian syndrome)      Past Surgical History:   Procedure Laterality Date     D & C  2005    retained placental fragment, removed in ER     HC HYSTEROSALPINGOGRAM  3/5/10    normal     Family History   Problem Relation Age of Onset     Hypertension Father      Lipids Father      Cancer Maternal Grandmother         skin     Osteoporosis Maternal Grandmother      Heart Disease Paternal Grandfather      Cerebrovascular Disease Paternal Grandfather      C.A.D. Paternal Grandfather      Allergies Sister      Social History     Marital status:    Spouse name:   Number of children:   Years of education:   Highest education level:    Employer:   Financial resource strain:    Worry:    Inability:    Medical:    Non-medical:   Smoking status:    Types:    Quit date:    Years since quitting:   Smokeless tobacco:   Alcohol use:    Alcohol/week:      Drug use:   Sexual activity:    Partners:       Days per week:    Minutes per session:   Stress:    Talks on phone:    Gets together:    Attends Worship service:    Active member of club or organization:    Attends meetings of clubs or organizations:    Relationship status:    Fear of current or ex partner:    Emotionally abused:    Physically abused:    Forced sexual activity:   Other Topics   Parent/sibling w/ CABG, MI or angioplasty before 65F 55M?       Current Outpatient Medications:      sertraline (ZOLOFT) 100 MG tablet, Take 100 mg by mouth daily , Disp: 90 tablet, Rfl: 3     benzonatate (TESSALON)  "200 MG capsule, Take 1 capsule (200 mg) by mouth 3 times daily as needed for cough (Patient not taking: Reported on 12/30/2020), Disp: 30 capsule, Rfl: 1     fluocinonide (LIDEX) 0.05 % cream, APPLY TWICE DAILY TO ECZEMA. AVOID FACE AND GROIN, Disp: , Rfl: 0     hydrOXYzine (ATARAX) 10 MG tablet, TAKE 1 TO 4 TABLETS BY MOUTH AT BEDTIME AS NEEDED ITCH, Disp: , Rfl: 1     oseltamivir (TAMIFLU) 75 MG capsule, Take 1 capsule (75 mg) by mouth daily (Patient not taking: Reported on 12/30/2020), Disp: 10 capsule, Rfl: 0     Allergies   Allergen Reactions     Cats        Past medical, surgical, social and family history were reviewed and updated in [facility].    EXAM:  not currently breastfeeding.  BMI= There is no height or weight on file to calculate BMI.  No LMP recorded.  General - pleasant female in no acute distress.  Neurological - alert and oriented X 3  Psychiatric - normal mood and affect      COUNSELING:   Reviewed preventive health counseling, as reflected in patient instructions   reports that she quit smoking about 20 years ago. Her smoking use included cigarettes. She has never used smokeless tobacco.    There is no height or weight on file to calculate BMI.    FRAX Risk Assessment    ASSESSMENT:  45 year old female with satisfactory annual exam  (Z01.419) Encounter for gynecological examination without abnormal finding  (primary encounter diagnosis)  Comment: vasectomy  Plan: plan fasting labs in 2021. Discussed perimenopausal bleeding pattern and that she should notify us if cycles occur every 21 days or closer, last longer than 10 days, she has spotting between cycles or is bleeding more than a pad an hour.  She indicated understanding.    MD Itzel Fleming Cindy Atkins is a 45 year old female who is being evaluated via a billable telephone visit.      The patient has been notified of following:     \"This telephone visit will be conducted via a call between you and your physician/provider. We have " "found that certain health care needs can be provided without the need for a physical exam.  This service lets us provide the care you need with a short phone conversation.  If a prescription is necessary we can send it directly to your pharmacy.  If lab work is needed we can place an order for that and you can then stop by our lab to have the test done at a later time.    Telephone visits are billed at different rates depending on your insurance coverage. During this emergency period, for some insurers they may be billed the same as an in-person visit.  Please reach out to your insurance provider with any questions.    If during the course of the call the physician/provider feels a telephone visit is not appropriate, you will not be charged for this service.\"    Patient has given verbal consent for Telephone visit?  Yes    What phone number would you like to be contacted at?   [phone]  How would you like to obtain your AVS? Jeannie    Phone call duration: 10 minutes    ARMINDA RON MD      "

## 2020-12-31 ASSESSMENT — ANXIETY QUESTIONNAIRES: GAD7 TOTAL SCORE: 3

## 2021-01-04 ENCOUNTER — HOSPITAL ENCOUNTER (OUTPATIENT)
Dept: MAMMOGRAPHY | Facility: CLINIC | Age: 46
Discharge: HOME | End: 2021-01-04
Attending: INTERNAL MEDICINE | Admitting: OBSTETRICS & GYNECOLOGY
Payer: COMMERCIAL

## 2021-01-04 DIAGNOSIS — Z12.31 VISIT FOR SCREENING MAMMOGRAM: ICD-10-CM

## 2021-01-04 PROCEDURE — 77063 BREAST TOMOSYNTHESIS BI: CPT

## 2021-09-19 ENCOUNTER — HEALTH MAINTENANCE LETTER (OUTPATIENT)
Age: 46
End: 2021-09-19

## 2022-03-06 ENCOUNTER — HEALTH MAINTENANCE LETTER (OUTPATIENT)
Age: 47
End: 2022-03-06

## 2022-04-28 ENCOUNTER — OFFICE VISIT (OUTPATIENT)
Dept: FAMILY MEDICINE | Facility: CLINIC | Age: 47
End: 2022-04-28
Payer: COMMERCIAL

## 2022-04-28 VITALS
HEIGHT: 63 IN | BODY MASS INDEX: 23.74 KG/M2 | RESPIRATION RATE: 20 BRPM | OXYGEN SATURATION: 98 % | SYSTOLIC BLOOD PRESSURE: 121 MMHG | HEART RATE: 71 BPM | WEIGHT: 134 LBS | TEMPERATURE: 98.6 F | DIASTOLIC BLOOD PRESSURE: 76 MMHG

## 2022-04-28 DIAGNOSIS — F10.20 ALCOHOL DEPENDENCE, UNCOMPLICATED (H): ICD-10-CM

## 2022-04-28 DIAGNOSIS — H81.11 BENIGN PAROXYSMAL POSITIONAL VERTIGO OF RIGHT EAR: Primary | ICD-10-CM

## 2022-04-28 DIAGNOSIS — R42 DIZZINESS: ICD-10-CM

## 2022-04-28 LAB
ERYTHROCYTE [DISTWIDTH] IN BLOOD BY AUTOMATED COUNT: 12.1 % (ref 10–15)
HCT VFR BLD AUTO: 41 % (ref 35–47)
HGB BLD-MCNC: 13.7 G/DL (ref 11.7–15.7)
MCH RBC QN AUTO: 28.6 PG (ref 26.5–33)
MCHC RBC AUTO-ENTMCNC: 33.4 G/DL (ref 31.5–36.5)
MCV RBC AUTO: 86 FL (ref 78–100)
PLATELET # BLD AUTO: 256 10E3/UL (ref 150–450)
RBC # BLD AUTO: 4.79 10E6/UL (ref 3.8–5.2)
WBC # BLD AUTO: 4.2 10E3/UL (ref 4–11)

## 2022-04-28 PROCEDURE — 80053 COMPREHEN METABOLIC PANEL: CPT | Performed by: INTERNAL MEDICINE

## 2022-04-28 PROCEDURE — 85027 COMPLETE CBC AUTOMATED: CPT | Performed by: INTERNAL MEDICINE

## 2022-04-28 PROCEDURE — 36415 COLL VENOUS BLD VENIPUNCTURE: CPT | Performed by: INTERNAL MEDICINE

## 2022-04-28 PROCEDURE — 99214 OFFICE O/P EST MOD 30 MIN: CPT | Performed by: INTERNAL MEDICINE

## 2022-04-28 RX ORDER — MECLIZINE HYDROCHLORIDE 25 MG/1
25-50 TABLET ORAL 3 TIMES DAILY PRN
Qty: 30 TABLET | Refills: 1 | Status: SHIPPED | OUTPATIENT
Start: 2022-04-28

## 2022-04-28 RX ORDER — DESONIDE 0.5 MG/G
CREAM TOPICAL
COMMUNITY
Start: 2022-03-10

## 2022-04-28 ASSESSMENT — PAIN SCALES - GENERAL: PAINLEVEL: NO PAIN (0)

## 2022-04-28 NOTE — PATIENT INSTRUCTIONS
As discussed , condition called BPPV which will need medication and Epleys maneuver as below.     Will do the lab work baseline not done since 2018 . Please hydrate 2liters of water / day and medication sen to your pharmacy.

## 2022-04-28 NOTE — PROGRESS NOTES
Assessment and Plan    1. Benign paroxysmal positional vertigo of right ear  New problem, as per the pt endorsing that the episodes Happening when she is turning on bed and getting up from the bed. Does have nausea . [place] Hallpike positive on Rt ear . Will give Emperic Meclizine and Epleys maneuver as explained in AVS . Pt understood and agreed with the plan.   - meclizine (ANTIVERT) 25 MG tablet; Take 1-2 tablets (25-50 mg) by mouth 3 times daily as needed for dizziness  Dispense: 30 tablet; Refill: 1    2. Dizziness  Differential diagnosis of electrolyte abnormalities, Anemia cannot be excluded. Labs revieweed done in 2018. She is here for acute visit of Vertigo/ dizziness happening upto 3x/day. Will recheck labs and do further recommendations.   - REVIEW OF HEALTH MAINTENANCE PROTOCOL ORDERS  - Comprehensive metabolic panel (BMP + Alb, Alk Phos, ALT, AST, Total. Bili, TP); Future  - CBC with platelets; Future  - Comprehensive metabolic panel (BMP + Alb, Alk Phos, ALT, AST, Total. Bili, TP)  - CBC with platelets    3. Alcohol dependence, uncomplicated (H)  Pt does have alcohol consumption in the weekends. Last alcohol on Monday night 4/25 and does endorse once a week.        Patient Instructions   As discussed , condition called BPPV which will need medication and Epleys maneuver as below.     Will do the lab work baseline not done since 2018 . Please hydrate 2liters of water / day and medication sen to your pharmacy.          Return in about 3 months (around 7/28/2022), or if symptoms worsen or fail to improve, for Preventative Visit , PAP .    Sia Rehman MD  [facility]      Ana Robbins is a 46 year old who presents for the following health issues       History of Present Illness       Reason for visit:  Dizzy and nausea  Symptom onset:  1-3 days ago  Symptoms include:  Some feelings of vertigo  Symptom intensity:  Moderate  Symptom progression:  Staying the same  Had these  symptoms before:  No  What makes it worse:  Not sure  What makes it better:  No    She eats 4 or more servings of fruits and vegetables daily.She consumes 0 sweetened beverage(s) daily.She exercises with enough effort to increase her heart rate 20 to 29 minutes per day.  She exercises with enough effort to increase her heart rate 3 or less days per week.   She is taking medications regularly.       Pt is new to me, . Last seen by Jessica LUCIO in 2021  .      Allergies   Allergen Reactions     Cats         Past Medical History:   Diagnosis Date     Cervical high risk HPV (human papillomavirus) test positive 2018: NIL pap, + HR HPV (not 16 or 18) result.      PCOS (polycystic ovarian syndrome)        Past Surgical History:   Procedure Laterality Date     D & C  2005    retained placental fragment, removed in ER     HC HYSTEROSALPINGOGRAM  3/5/10    normal       Family History   Problem Relation Age of Onset     Hypertension Father      Lipids Father      Cancer Maternal Grandmother         skin     Osteoporosis Maternal Grandmother      Heart Disease Paternal Grandfather      Cerebrovascular Disease Paternal Grandfather      C.A.D. Paternal Grandfather      Allergies Sister        Social History     Tobacco Use     Smoking status: Former Smoker     Types: Cigarettes     Quit date: 2000     Years since quittin.7     Smokeless tobacco: Never Used   Substance Use Topics     Alcohol use: Yes     Alcohol/week: 0.0 - 2.0 standard drinks     Comment: occ        Current Outpatient Medications   Medication     desonide (DESOWEN) 0.05 % external cream     fluocinonide (LIDEX) 0.05 % cream     meclizine (ANTIVERT) 25 MG tablet     sertraline (ZOLOFT) 100 MG tablet     benzonatate (TESSALON) 200 MG capsule     hydrOXYzine (ATARAX) 10 MG tablet     No current facility-administered medications for this visit.        Review of Systems   Constitutional, HEENT, cardiovascular, pulmonary, GI, ,  musculoskeletal, neuro, skin, endocrine and psych systems are negative, except as otherwise noted.      Objective    There were no vitals taken for this visit.  There is no height or weight on file to calculate BMI.  Physical Exam   GENERAL: healthy, alert and no distress  ENT Exam : Ear canals and TM's normal, nose and mouth without ulcers or lesions, NO pharyngeal erythema, Cervical lymphadenopathy or Sinus tenderness on palpation.. [place] Hallpike positive on Rt ear   NECK: no adenopathy, no asymmetry, masses, or scars and thyroid normal to palpation  RESP: lungs clear to auscultation - no rales, rhonchi or wheezes  CV: regular rate and rhythm, normal S1 S2, no S3 or S4, no murmur, click or rub, no peripheral edema and peripheral pulses strong  ABDOMEN: soft, nontender, no hepatosplenomegaly, no masses and bowel sounds normal  MS: no gross musculoskeletal defects noted, no edema

## 2022-04-29 LAB
ALBUMIN SERPL-MCNC: 4.2 G/DL (ref 3.4–5)
ALP SERPL-CCNC: 84 U/L (ref 40–150)
ALT SERPL W P-5'-P-CCNC: 38 U/L (ref 0–50)
ANION GAP SERPL CALCULATED.3IONS-SCNC: 6 MMOL/L (ref 3–14)
AST SERPL W P-5'-P-CCNC: 19 U/L (ref 0–45)
BILIRUB SERPL-MCNC: 0.4 MG/DL (ref 0.2–1.3)
BUN SERPL-MCNC: 10 MG/DL (ref 7–30)
CALCIUM SERPL-MCNC: 8.8 MG/DL (ref 8.5–10.1)
CHLORIDE BLD-SCNC: 107 MMOL/L (ref 94–109)
CO2 SERPL-SCNC: 25 MMOL/L (ref 20–32)
CREAT SERPL-MCNC: 0.51 MG/DL (ref 0.52–1.04)
GFR SERPL CREATININE-BSD FRML MDRD: >90 ML/MIN/1.73M2
GLUCOSE BLD-MCNC: 104 MG/DL (ref 70–99)
POTASSIUM BLD-SCNC: 3.8 MMOL/L (ref 3.4–5.3)
PROT SERPL-MCNC: 7.8 G/DL (ref 6.8–8.8)
SODIUM SERPL-SCNC: 138 MMOL/L (ref 133–144)

## 2022-07-29 ENCOUNTER — OFFICE VISIT (OUTPATIENT)
Dept: OBGYN | Facility: CLINIC | Age: 47
End: 2022-07-29
Payer: COMMERCIAL

## 2022-07-29 VITALS
BODY MASS INDEX: 23.53 KG/M2 | DIASTOLIC BLOOD PRESSURE: 78 MMHG | HEART RATE: 68 BPM | WEIGHT: 132.8 LBS | SYSTOLIC BLOOD PRESSURE: 117 MMHG | OXYGEN SATURATION: 97 % | HEIGHT: 63 IN

## 2022-07-29 DIAGNOSIS — Z23 NEED FOR TDAP VACCINATION: ICD-10-CM

## 2022-07-29 DIAGNOSIS — Z12.4 PAP SMEAR FOR CERVICAL CANCER SCREENING: ICD-10-CM

## 2022-07-29 DIAGNOSIS — Z13.220 SCREENING FOR LIPID DISORDERS: ICD-10-CM

## 2022-07-29 DIAGNOSIS — Z01.419 ENCOUNTER FOR GYNECOLOGICAL EXAMINATION WITHOUT ABNORMAL FINDING: Primary | ICD-10-CM

## 2022-07-29 DIAGNOSIS — Z13.29 SCREENING FOR THYROID DISORDER: ICD-10-CM

## 2022-07-29 DIAGNOSIS — Z13.1 SCREENING FOR DIABETES MELLITUS: ICD-10-CM

## 2022-07-29 DIAGNOSIS — F41.9 ANXIETY: ICD-10-CM

## 2022-07-29 PROCEDURE — 90715 TDAP VACCINE 7 YRS/> IM: CPT | Performed by: OBSTETRICS & GYNECOLOGY

## 2022-07-29 PROCEDURE — 87624 HPV HI-RISK TYP POOLED RSLT: CPT | Performed by: OBSTETRICS & GYNECOLOGY

## 2022-07-29 PROCEDURE — 99396 PREV VISIT EST AGE 40-64: CPT | Mod: 25 | Performed by: OBSTETRICS & GYNECOLOGY

## 2022-07-29 PROCEDURE — 90471 IMMUNIZATION ADMIN: CPT | Performed by: OBSTETRICS & GYNECOLOGY

## 2022-07-29 PROCEDURE — G0145 SCR C/V CYTO,THINLAYER,RESCR: HCPCS | Performed by: OBSTETRICS & GYNECOLOGY

## 2022-07-29 RX ORDER — SERTRALINE HYDROCHLORIDE 100 MG/1
100 TABLET, FILM COATED ORAL DAILY
Qty: 90 TABLET | Refills: 3 | Status: SHIPPED | OUTPATIENT
Start: 2022-07-29 | End: 2023-08-03

## 2022-07-29 ASSESSMENT — ANXIETY QUESTIONNAIRES
3. WORRYING TOO MUCH ABOUT DIFFERENT THINGS: NOT AT ALL
6. BECOMING EASILY ANNOYED OR IRRITABLE: NOT AT ALL
GAD7 TOTAL SCORE: 1
2. NOT BEING ABLE TO STOP OR CONTROL WORRYING: NOT AT ALL
GAD7 TOTAL SCORE: 1
7. FEELING AFRAID AS IF SOMETHING AWFUL MIGHT HAPPEN: NOT AT ALL
1. FEELING NERVOUS, ANXIOUS, OR ON EDGE: SEVERAL DAYS
5. BEING SO RESTLESS THAT IT IS HARD TO SIT STILL: NOT AT ALL

## 2022-07-29 ASSESSMENT — PATIENT HEALTH QUESTIONNAIRE - PHQ9
5. POOR APPETITE OR OVEREATING: NOT AT ALL
SUM OF ALL RESPONSES TO PHQ QUESTIONS 1-9: 1

## 2022-07-29 NOTE — PATIENT INSTRUCTIONS
Make lab only appointment fasting (nothing but water 10 hours prior to blood draw) at any [facility].

## 2022-07-29 NOTE — PROGRESS NOTES
Itzel is a 46 year old  female who presents for annual exam.     Menses are regular q 28-30 days and normal lasting 5 days.  Menses flow: normal.  Patient's last menstrual period was 2022.. Using vasectomy for contraception.  She is not currently considering pregnancy.  Besides routine health maintenance, she has no other health concerns today . Son will be a senior this year (wants to go to [employer]) and daughters will be middle school and 5th grade (3 schools this year)  [place] 20 yr [place] in [place]--[place]!  Overall feeling well.   GYNECOLOGIC HISTORY:  Menarche:  Itzel is sexually active with 1male partner(s) and is currently in monogamous relationship.    History sexually transmitted infections:No STD history  STI testing offered?  Declined  ILAN exposure: Unknown  History of abnormal Pap smear: NO - age 30- 65 PAP every 3 years recommended  Family history of breast CA: No  Family history of uterine/ovarian CA: No    Family history of colon CA: No    HEALTH MAINTENANCE:  Cholesterol: (  Cholesterol   Date Value Ref Range Status   2016 186 <200 mg/dL Final    History of abnormal lipids: No  Mammo: 23 . History of abnormal Mammo: No.  Regular Self Breast Exams: No  Calcium/Vitamin D intake: source:  dairy Adequate? Yes  TSH: (  TSH   Date Value Ref Range Status   2018 2.09 0.40 - 4.00 mU/L Final    )  Pap; (  Lab Results   Component Value Date    PAP NIL 2019    PAP NIL 2018    PAP NIL 10/28/2014    )    HISTORY:  OB History    Para Term  AB Living   3 3 3 0 0 3   SAB IAB Ectopic Multiple Live Births   0 0 0 0 3      # Outcome Date GA Lbr Nasir/2nd Weight Sex Delivery Anes PTL Lv   3 Term 08/15/12 39w1d 04:36 / 00:02 2.551 kg (5 lb 10 oz) F Vag-Spont None N AMANDA      Birth Comments: went very fast      Name: Jacqueline      Apgar1: 8  Apgar5: 9   2 Term 12/06/10 39w3d  3.289 kg (7 lb 4 oz) F  EPI  AMANDA      Name: Renita   1 Term 05 38w4d  3.204 kg  (7 lb 1 oz) M    AMANDA      Birth Comments: retained placental fragment 4 days after birth      Name: Chris      Obstetric Comments   --Concieved spontanously after 3+ cycles of clomid   --conceived with Letrazole step-up     Past Medical History:   Diagnosis Date     Cervical high risk HPV (human papillomavirus) test positive 2018: NIL pap, + HR HPV (not 16 or 18) result.      PCOS (polycystic ovarian syndrome)      Past Surgical History:   Procedure Laterality Date     D & C  2005    retained placental fragment, removed in ER     HC HYSTEROSALPINGOGRAM  3/5/10    normal     Family History   Problem Relation Age of Onset     Hypertension Father      Lipids Father      Cancer Maternal Grandmother         skin     Osteoporosis Maternal Grandmother      Heart Disease Paternal Grandfather      Cerebrovascular Disease Paternal Grandfather      C.A.D. Paternal Grandfather      Allergies Sister      Social History     Socioeconomic History     Marital status:      Number of children: 3   Occupational History     Employer: [employer]   Tobacco Use     Smoking status: Former Smoker     Types: Cigarettes     Quit date: 2000     Years since quittin.9     Smokeless tobacco: Never Used   Substance and Sexual Activity     Alcohol use: Yes     Alcohol/week: 0.0 - 2.0 standard drinks     Comment: occ     Drug use: No     Sexual activity: Yes     Partners: Male     Comment: vasectomy   Other Topics Concern     Parent/sibling w/ CABG, MI or angioplasty before 65F 55M? No       Current Outpatient Medications:      desonide (DESOWEN) 0.05 % external cream, APPLY TO FACE TWICE A DAY, Disp: , Rfl:      fluocinonide (LIDEX) 0.05 % cream, APPLY TWICE DAILY TO ECZEMA. AVOID FACE AND GROIN, Disp: , Rfl: 0     meclizine (ANTIVERT) 25 MG tablet, Take 1-2 tablets (25-50 mg) by mouth 3 times daily as needed for dizziness, Disp: 30 tablet, Rfl: 1     sertraline (ZOLOFT) 100 MG tablet,  "Take 100 mg by mouth daily , Disp: 90 tablet, Rfl: 3     Allergies   Allergen Reactions     Shrimp      Cats        Past medical, surgical, social and family history were reviewed and updated in [facility].    EXAM:  /78   Pulse 68   Ht 1.6 m (5' 3\")   Wt 60.2 kg (132 lb 12.8 oz)   LMP 07/11/2022   SpO2 97%   BMI 23.52 kg/m     BMI: Body mass index is 23.52 kg/m .  Constitutional: healthy, alert and no distress  Head: Normocephalic. No masses, lesions, tenderness or abnormalities  Neck: Neck supple. Trachea midline. No adenopathy. Thyroid symmetric, normal size.   Cardiovascular: RRR.   Respiratory: Negative.   Breast: Breasts reveal mild symmetric fibrocystic densities, but there are no dominant, discrete, fixed or suspicious masses found.  Gastrointestinal: Abdomen soft, non-tender, non-distended. No masses, organomegaly.  :  Vulva:  No external lesions, normal female hair distribution, no inguinal adenopathy.    Urethra:  Midline, non-tender, well supported, no discharge  Vagina:  Moist, pink, no abnormal discharge, no lesions  Uterus:  Normal size , non-tender, freely mobile  Ovaries:  No masses appreciated, non-tender, mobile  Rectal Exam: deferred  Musculoskeletal: extremities normal  Skin: no suspicious lesions or rashes  Psychiatric: Affect appropriate, cooperative,mentation appears normal.     COUNSELING:   Reviewed preventive health counseling, as reflected in patient instructions       Osteoporosis prevention/bone health   reports that she quit smoking about 21 years ago. Her smoking use included cigarettes. She has never used smokeless tobacco.    Body mass index is 23.52 kg/m .    FRAX Risk Assessment    ASSESSMENT:  46 year old female with satisfactory annual exam  (Z01.419) Encounter for gynecological examination without abnormal finding  (primary encounter diagnosis)  Comment: vasectomy  Plan: briefly discussed perimenopause and answered a few questions.     (Z23) Need for Tdap " vaccination  Comment: due  Plan: VACCINE ADMINISTRATION [facility], INITIAL        Given today    (Z12.4) Pap smear for cervical cancer screening  Plan: Pap screen with HPV - recommended age 30 - 65         years        Discussed sending pap smear for HPV typing as well and that if both pap and HPV are negative, then can have q5 year pap smears.    (F41.9) Anxiety  Comment: stable  Plan: sertraline (ZOLOFT) 100 MG tablet        Refill was done    (Z13.220) Screening for lipid disorders  Comment: due  Plan: Lipid panel reflex to direct LDL Fasting        RTC fasting for lab    (Z13.1) Screening for diabetes mellitus  Plan: Hemoglobin A1c, Glucose        RTC fasting for lab    (Z13.29) Screening for thyroid disorder  Plan: TSH with free T4 reflex        RTC for lab.      Sandi Castillo MD

## 2022-08-02 LAB
BKR LAB AP GYN ADEQUACY: NORMAL
BKR LAB AP GYN INTERPRETATION: NORMAL
BKR LAB AP HPV REFLEX: NORMAL
BKR LAB AP PREVIOUS ABNORMAL: NORMAL
PATH REPORT.COMMENTS IMP SPEC: NORMAL
PATH REPORT.COMMENTS IMP SPEC: NORMAL
PATH REPORT.RELEVANT HX SPEC: NORMAL

## 2022-08-04 LAB
HUMAN PAPILLOMA VIRUS 16 DNA: NEGATIVE
HUMAN PAPILLOMA VIRUS 18 DNA: NEGATIVE
HUMAN PAPILLOMA VIRUS FINAL DIAGNOSIS: NORMAL
HUMAN PAPILLOMA VIRUS OTHER HR: NEGATIVE

## 2022-08-21 ENCOUNTER — HEALTH MAINTENANCE LETTER (OUTPATIENT)
Age: 47
End: 2022-08-21

## 2022-11-21 ENCOUNTER — HEALTH MAINTENANCE LETTER (OUTPATIENT)
Age: 47
End: 2022-11-21

## 2023-09-17 ENCOUNTER — HEALTH MAINTENANCE LETTER (OUTPATIENT)
Age: 48
End: 2023-09-17

## 2023-10-31 DIAGNOSIS — F41.9 ANXIETY: ICD-10-CM

## 2023-11-01 RX ORDER — SERTRALINE HYDROCHLORIDE 100 MG/1
100 TABLET, FILM COATED ORAL DAILY
Qty: 90 TABLET | Refills: 0 | OUTPATIENT
Start: 2023-11-01

## 2023-11-02 NOTE — TELEPHONE ENCOUNTER
Lvm for pt to call back and schedule visit w Dr Valenzuela for refills on sertraline. Unsure if she still would like Dr Valenzuela as her pcp as she hasn't been seen since 2018.  Daphnie Schafer, CMA

## 2023-11-03 NOTE — TELEPHONE ENCOUNTER
Lvm x2for pt to call back and schedule visit w Dr Valenzuela for refills on sertraline.   Daphnie Schafer, CMA

## 2023-11-08 ENCOUNTER — OFFICE VISIT (OUTPATIENT)
Dept: OBGYN | Facility: CLINIC | Age: 48
End: 2023-11-08
Payer: COMMERCIAL

## 2023-11-08 VITALS
SYSTOLIC BLOOD PRESSURE: 110 MMHG | HEART RATE: 69 BPM | BODY MASS INDEX: 23.85 KG/M2 | WEIGHT: 134.6 LBS | HEIGHT: 63 IN | OXYGEN SATURATION: 99 % | DIASTOLIC BLOOD PRESSURE: 76 MMHG

## 2023-11-08 DIAGNOSIS — Z12.11 SPECIAL SCREENING FOR MALIGNANT NEOPLASMS, COLON: ICD-10-CM

## 2023-11-08 DIAGNOSIS — Z23 HIGH PRIORITY FOR 2019-NCOV VACCINE: ICD-10-CM

## 2023-11-08 DIAGNOSIS — Z01.84 ANTIBODY RESPONSE EXAM: ICD-10-CM

## 2023-11-08 DIAGNOSIS — Z13.1 SCREENING FOR DIABETES MELLITUS: ICD-10-CM

## 2023-11-08 DIAGNOSIS — Z13.29 SCREENING FOR THYROID DISORDER: ICD-10-CM

## 2023-11-08 DIAGNOSIS — Z13.0 SCREENING, ANEMIA, DEFICIENCY, IRON: ICD-10-CM

## 2023-11-08 DIAGNOSIS — Z13.220 SCREENING FOR LIPID DISORDERS: ICD-10-CM

## 2023-11-08 DIAGNOSIS — Z01.419 ENCOUNTER FOR GYNECOLOGICAL EXAMINATION WITHOUT ABNORMAL FINDING: Primary | ICD-10-CM

## 2023-11-08 DIAGNOSIS — F41.9 ANXIETY: ICD-10-CM

## 2023-11-08 PROCEDURE — 90480 ADMN SARSCOV2 VAC 1/ONLY CMP: CPT | Performed by: OBSTETRICS & GYNECOLOGY

## 2023-11-08 PROCEDURE — 91320 SARSCV2 VAC 30MCG TRS-SUC IM: CPT | Performed by: OBSTETRICS & GYNECOLOGY

## 2023-11-08 PROCEDURE — 99213 OFFICE O/P EST LOW 20 MIN: CPT | Mod: 25 | Performed by: OBSTETRICS & GYNECOLOGY

## 2023-11-08 PROCEDURE — 99396 PREV VISIT EST AGE 40-64: CPT | Mod: 25 | Performed by: OBSTETRICS & GYNECOLOGY

## 2023-11-08 RX ORDER — SERTRALINE HYDROCHLORIDE 100 MG/1
100 TABLET, FILM COATED ORAL DAILY
Qty: 90 TABLET | Refills: 4 | Status: SHIPPED | OUTPATIENT
Start: 2023-11-08

## 2023-11-08 ASSESSMENT — ANXIETY QUESTIONNAIRES
3. WORRYING TOO MUCH ABOUT DIFFERENT THINGS: SEVERAL DAYS
GAD7 TOTAL SCORE: 3
5. BEING SO RESTLESS THAT IT IS HARD TO SIT STILL: NOT AT ALL
7. FEELING AFRAID AS IF SOMETHING AWFUL MIGHT HAPPEN: NOT AT ALL
1. FEELING NERVOUS, ANXIOUS, OR ON EDGE: SEVERAL DAYS
6. BECOMING EASILY ANNOYED OR IRRITABLE: NOT AT ALL
2. NOT BEING ABLE TO STOP OR CONTROL WORRYING: NOT AT ALL
GAD7 TOTAL SCORE: 3

## 2023-11-08 ASSESSMENT — PATIENT HEALTH QUESTIONNAIRE - PHQ9
5. POOR APPETITE OR OVEREATING: SEVERAL DAYS
SUM OF ALL RESPONSES TO PHQ QUESTIONS 1-9: 2

## 2023-11-08 NOTE — PROGRESS NOTES
Itzel is a 48 year old  female who presents for annual exam.     Menses are regular q 28-30 days and normal lasting 5 days.  Menses flow: normal.  Patient's last menstrual period was 10/24/2023.. Using vasectomy for contraception.  She is not currently considering pregnancy.  Besides routine health maintenance, she has no other health concerns today .  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  Doing okay on Zoloft but some stress with work since relator and not good housing market. Has some stressors going on.    GYNECOLOGIC HISTORY:  Menarche:   Itzel is sexually active with 1male partner(s) and is currently in monogamous relationship.    History sexually transmitted infections:No STD history  STI testing offered?  Declined  ILAN exposure: Unknown  History of abnormal Pap smear: NO - age 30-65 PAP every 5 years with negative HPV co-testing recommended  Family history of breast CA: No  Family history of uterine/ovarian CA: No    Family history of colon CA: No    HEALTH MAINTENANCE:  Cholesterol: (  Cholesterol   Date Value Ref Range Status   2016 186 <200 mg/dL Final    History of abnormal lipids: No  Mammo: 23 . History of abnormal Mammo: No.  Regular Self Breast Exams: No  Calcium/Vitamin D intake: source:  dairy Adequate? Yes  TSH: (  TSH   Date Value Ref Range Status   2018 2.09 0.40 - 4.00 mU/L Final    )  Pap; (  Lab Results   Component Value Date    PAP NIL 2019    PAP NIL 2018    PAP NIL 10/28/2014    )    HISTORY:  OB History    Para Term  AB Living   3 3 3 0 0 3   SAB IAB Ectopic Multiple Live Births   0 0 0 0 3      # Outcome Date GA Lbr Nasir/2nd Weight Sex Delivery Anes PTL Lv   3 Term 08/15/12 39w1d 04:36 / 00:02 2.551 kg (5 lb 10 oz) F Vag-Spont None N AMANDA      Birth Comments: went very fast      Name: Jacqueline      Apgar1: 8  Apgar5: 9   2 Term 12/06/10 39w3d  3.289 kg (7 lb 4 oz) F  EPI  AMANDA       Name: Renita Jean Term 05 38w4d  3.204 kg (7 lb 1 oz) M    AMANDA      Birth Comments: retained placental fragment 4 days after birth      Name: Chris      Obstetric Comments   --Concieved spontanously after 3+ cycles of clomid   --conceived with Letrazole step-up     Past Medical History:   Diagnosis Date    Cervical high risk HPV (human papillomavirus) test positive 2018: NIL pap, + HR HPV (not 16 or 18) result.     PCOS (polycystic ovarian syndrome)      Past Surgical History:   Procedure Laterality Date    D & C  2005    retained placental fragment, removed in ER    HC HYSTEROSALPINGOGRAM  3/5/10    normal     Family History   Problem Relation Age of Onset    Hypertension Father     Lipids Father     Cancer Maternal Grandmother         skin    Osteoporosis Maternal Grandmother     Heart Disease Paternal Grandfather     Cerebrovascular Disease Paternal Grandfather     C.A.D. Paternal Grandfather     Allergies Sister      Social History     Socioeconomic History    Marital status:     Number of children: 3   Occupational History     Employer: "[employer]   Tobacco Use    Smoking status: Former     Types: Cigarettes     Quit date: 2000     Years since quittin.2    Smokeless tobacco: Never   Substance and Sexual Activity    Alcohol use: Yes     Alcohol/week: 0.0 - 2.0 standard drinks of alcohol     Comment: occ    Drug use: No    Sexual activity: Yes     Partners: Male     Comment: vasectomy   Other Topics Concern    Parent/sibling w/ CABG, MI or angioplasty before 65F 55M? No       Current Outpatient Medications:     desonide (DESOWEN) 0.05 % external cream, APPLY TO FACE TWICE A DAY, Disp: , Rfl:     fluocinonide (LIDEX) 0.05 % cream, APPLY TWICE DAILY TO ECZEMA. AVOID FACE AND GROIN, Disp: , Rfl: 0    meclizine (ANTIVERT) 25 MG tablet, Take 1-2 tablets (25-50 mg) by mouth 3 times daily as needed for dizziness, Disp: 30 tablet, Rfl: 1    sertraline  "(ZOLOFT) 100 MG tablet, TAKE 1 TABLET BY MOUTH EVERY DAY, Disp: 90 tablet, Rfl: 0     Allergies   Allergen Reactions    Shrimp     Cats        Past medical, surgical, social and family history were reviewed and updated in [facility].      EXAM:  /76   Pulse 69   Ht 1.61 m (5' 3.39\")   Wt 61.1 kg (134 lb 9.6 oz)   LMP 10/24/2023   SpO2 99%   BMI 23.55 kg/m     BMI: Body mass index is 23.55 kg/m .  Constitutional: healthy, alert and no distress  Head: Normocephalic. No masses, lesions, tenderness or abnormalities  Neck: Neck supple. Trachea midline. No adenopathy. Thyroid symmetric, normal size.   Cardiovascular: RRR.   Respiratory: Negative.   Breast: Breasts reveal mild symmetric fibrocystic densities, but there are no dominant, discrete, fixed or suspicious masses found.  Gastrointestinal: Abdomen soft, non-tender, non-distended. No masses, organomegaly.  :  Vulva:  No external lesions, normal female hair distribution, no inguinal adenopathy.    Urethra:  Midline, non-tender, well supported, no discharge  Vagina:  Moist, pink, no abnormal discharge, no lesions  Uterus:  Normal size , non-tender, freely mobile  Ovaries:  No masses appreciated, non-tender, mobile  Rectal Exam: deferred  Musculoskeletal: extremities normal  Skin: no suspicious lesions or rashes  Psychiatric: Affect appropriate, cooperative,mentation appears normal.     COUNSELING:   Reviewed preventive health counseling, as reflected in patient instructions   reports that she quit smoking about 23 years ago. Her smoking use included cigarettes. She has never used smokeless tobacco.    Body mass index is 23.55 kg/m .    FRAX Risk Assessment    ASSESSMENT:  48 year old female with satisfactory annual exam  (Z01.419) Encounter for gynecological examination without abnormal finding  (primary encounter diagnosis)  Comment: vasectomy   Plan: continue zoloft and discussed SAD light therapy    (Z23) High priority for 2019-nCoV vaccine  Plan: given " today    (Z12.11) Special screening for malignant neoplasms, colon  Plan: COLOGUARD(EXACT SCIENCES)        Due to age    (Z13.0) Screening, anemia, deficiency, iron  Plan: CBC with platelets        RTC for lab    (Z13.220) Screening for lipid disorders  Plan: Lipid panel reflex to direct LDL Fasting        RTC fasting for lab    (Z13.1) Screening for diabetes mellitus  Plan: Comprehensive metabolic panel, Hemoglobin A1c        RTC fasting for lab    (Z13.29) Screening for thyroid disorder  Plan: TSH with free T4 reflex        RTC for lab    (Z01.84) Antibody response exam  Comment: received one dose  Plan: Hepatitis B Surface Antibody        RTC for lab to see if immune    (F41.9) Anxiety  Comment: stable  Plan: sertraline (ZOLOFT) 100 MG tablet        Refill was done.     Sandi Castillo MD     alert

## 2023-11-08 NOTE — PATIENT INSTRUCTIONS
Make lab only appointment fasting (nothing but water 10 hours prior to blood draw) at any [facility].      Also a lizard sun lamp

## 2023-11-30 LAB — NONINV COLON CA DNA+OCC BLD SCRN STL QL: NEGATIVE

## 2023-12-05 ENCOUNTER — LAB (OUTPATIENT)
Dept: LAB | Facility: CLINIC | Age: 48
End: 2023-12-05
Payer: COMMERCIAL

## 2023-12-05 DIAGNOSIS — Z13.29 SCREENING FOR THYROID DISORDER: ICD-10-CM

## 2023-12-05 DIAGNOSIS — Z13.220 SCREENING FOR LIPID DISORDERS: ICD-10-CM

## 2023-12-05 DIAGNOSIS — Z13.1 SCREENING FOR DIABETES MELLITUS: ICD-10-CM

## 2023-12-05 DIAGNOSIS — Z13.0 SCREENING, ANEMIA, DEFICIENCY, IRON: ICD-10-CM

## 2023-12-05 DIAGNOSIS — Z01.84 ANTIBODY RESPONSE EXAM: ICD-10-CM

## 2023-12-05 LAB
ALBUMIN SERPL BCG-MCNC: 4.5 G/DL (ref 3.5–5.2)
ALP SERPL-CCNC: 97 U/L (ref 40–150)
ALT SERPL W P-5'-P-CCNC: 58 U/L (ref 0–50)
ANION GAP SERPL CALCULATED.3IONS-SCNC: 13 MMOL/L (ref 7–15)
AST SERPL W P-5'-P-CCNC: 35 U/L (ref 0–45)
BILIRUB SERPL-MCNC: 0.5 MG/DL
BUN SERPL-MCNC: 10.7 MG/DL (ref 6–20)
CALCIUM SERPL-MCNC: 9.1 MG/DL (ref 8.6–10)
CHLORIDE SERPL-SCNC: 103 MMOL/L (ref 98–107)
CHOLEST SERPL-MCNC: 264 MG/DL
CREAT SERPL-MCNC: 0.58 MG/DL (ref 0.51–0.95)
DEPRECATED HCO3 PLAS-SCNC: 23 MMOL/L (ref 22–29)
EGFRCR SERPLBLD CKD-EPI 2021: >90 ML/MIN/1.73M2
ERYTHROCYTE [DISTWIDTH] IN BLOOD BY AUTOMATED COUNT: 12 % (ref 10–15)
GLUCOSE SERPL-MCNC: 105 MG/DL (ref 70–99)
HBA1C MFR BLD: 5.4 % (ref 0–5.6)
HBV SURFACE AB SERPL IA-ACNC: 0 M[IU]/ML
HBV SURFACE AB SERPL IA-ACNC: NONREACTIVE M[IU]/ML
HCT VFR BLD AUTO: 42.8 % (ref 35–47)
HDLC SERPL-MCNC: 72 MG/DL
HGB BLD-MCNC: 14.2 G/DL (ref 11.7–15.7)
LDLC SERPL CALC-MCNC: 153 MG/DL
MCH RBC QN AUTO: 27.9 PG (ref 26.5–33)
MCHC RBC AUTO-ENTMCNC: 33.2 G/DL (ref 31.5–36.5)
MCV RBC AUTO: 84 FL (ref 78–100)
NONHDLC SERPL-MCNC: 192 MG/DL
PLATELET # BLD AUTO: 264 10E3/UL (ref 150–450)
POTASSIUM SERPL-SCNC: 3.9 MMOL/L (ref 3.4–5.3)
PROT SERPL-MCNC: 7.6 G/DL (ref 6.4–8.3)
RBC # BLD AUTO: 5.09 10E6/UL (ref 3.8–5.2)
SODIUM SERPL-SCNC: 139 MMOL/L (ref 135–145)
TRIGL SERPL-MCNC: 194 MG/DL
TSH SERPL DL<=0.005 MIU/L-ACNC: 2.23 UIU/ML (ref 0.3–4.2)
WBC # BLD AUTO: 6.1 10E3/UL (ref 4–11)

## 2023-12-05 PROCEDURE — 80061 LIPID PANEL: CPT

## 2023-12-05 PROCEDURE — 83036 HEMOGLOBIN GLYCOSYLATED A1C: CPT

## 2023-12-05 PROCEDURE — 86706 HEP B SURFACE ANTIBODY: CPT

## 2023-12-05 PROCEDURE — 85027 COMPLETE CBC AUTOMATED: CPT

## 2023-12-05 PROCEDURE — 80053 COMPREHEN METABOLIC PANEL: CPT

## 2023-12-05 PROCEDURE — 84443 ASSAY THYROID STIM HORMONE: CPT

## 2023-12-05 PROCEDURE — 36415 COLL VENOUS BLD VENIPUNCTURE: CPT

## 2024-10-09 ENCOUNTER — PATIENT OUTREACH (OUTPATIENT)
Dept: CARE COORDINATION | Facility: CLINIC | Age: 49
End: 2024-10-09
Payer: COMMERCIAL

## 2024-10-23 ENCOUNTER — PATIENT OUTREACH (OUTPATIENT)
Dept: CARE COORDINATION | Facility: CLINIC | Age: 49
End: 2024-10-23
Payer: COMMERCIAL

## 2024-11-12 DIAGNOSIS — F41.9 ANXIETY: ICD-10-CM

## 2024-11-12 NOTE — TELEPHONE ENCOUNTER
Patient due for visit.  [employer] message sent to schedule visit - once scheduled can send temp refill.  PHQ9 and GAD7 sent.    Loretta Neri RN

## 2024-11-13 RX ORDER — SERTRALINE HYDROCHLORIDE 100 MG/1
100 TABLET, FILM COATED ORAL DAILY
Qty: 90 TABLET | Refills: 4 | OUTPATIENT
Start: 2024-11-13

## 2025-01-04 ENCOUNTER — HEALTH MAINTENANCE LETTER (OUTPATIENT)
Age: 50
End: 2025-01-04

## 2025-02-12 ENCOUNTER — OFFICE VISIT (OUTPATIENT)
Dept: OBGYN | Facility: CLINIC | Age: 50
End: 2025-02-12
Payer: COMMERCIAL

## 2025-02-12 VITALS
BODY MASS INDEX: 24.34 KG/M2 | WEIGHT: 139.1 LBS | OXYGEN SATURATION: 100 % | HEART RATE: 74 BPM | DIASTOLIC BLOOD PRESSURE: 71 MMHG | SYSTOLIC BLOOD PRESSURE: 105 MMHG

## 2025-02-12 DIAGNOSIS — Z01.419 ENCOUNTER FOR GYNECOLOGICAL EXAMINATION WITHOUT ABNORMAL FINDING: Primary | ICD-10-CM

## 2025-02-12 DIAGNOSIS — Z23 HIGH PRIORITY FOR 2019-NCOV VACCINE: ICD-10-CM

## 2025-02-12 DIAGNOSIS — Z12.31 ENCOUNTER FOR SCREENING MAMMOGRAM FOR BREAST CANCER: ICD-10-CM

## 2025-02-12 DIAGNOSIS — F41.9 ANXIETY: ICD-10-CM

## 2025-02-12 DIAGNOSIS — Z23 NEED FOR PROPHYLACTIC VACCINATION AND INOCULATION AGAINST INFLUENZA: ICD-10-CM

## 2025-02-12 PROBLEM — F10.20 ALCOHOL DEPENDENCE, UNCOMPLICATED (H): Status: RESOLVED | Noted: 2022-04-28 | Resolved: 2025-02-12

## 2025-02-12 PROCEDURE — 91320 SARSCV2 VAC 30MCG TRS-SUC IM: CPT

## 2025-02-12 PROCEDURE — 90656 IIV3 VACC NO PRSV 0.5 ML IM: CPT

## 2025-02-12 PROCEDURE — 90480 ADMN SARSCOV2 VAC 1/ONLY CMP: CPT

## 2025-02-12 PROCEDURE — 99396 PREV VISIT EST AGE 40-64: CPT | Mod: 25

## 2025-02-12 PROCEDURE — 90471 IMMUNIZATION ADMIN: CPT

## 2025-02-12 RX ORDER — SERTRALINE HYDROCHLORIDE 100 MG/1
100 TABLET, FILM COATED ORAL DAILY
Qty: 90 TABLET | Refills: 4 | Status: SHIPPED | OUTPATIENT
Start: 2025-02-12

## 2025-02-12 ASSESSMENT — PATIENT HEALTH QUESTIONNAIRE - PHQ9
10. IF YOU CHECKED OFF ANY PROBLEMS, HOW DIFFICULT HAVE THESE PROBLEMS MADE IT FOR YOU TO DO YOUR WORK, TAKE CARE OF THINGS AT HOME, OR GET ALONG WITH OTHER PEOPLE: NOT DIFFICULT AT ALL
SUM OF ALL RESPONSES TO PHQ QUESTIONS 1-9: 1
SUM OF ALL RESPONSES TO PHQ QUESTIONS 1-9: 1

## 2025-02-12 NOTE — PATIENT INSTRUCTIONS
Things to Know About Hormone Therapy     Menopause is a normal life event for women - it is not an illness or medical condition.  Every person's experience of menopause is different.  Some women have many symptoms that interfere with life, and others may not experience any charge other than their period stops.    Many women with symptoms often suffer in silence and do not realize how effective and safe hormone therapy can be to improving their symptoms and quality of life.  Most common symptoms  Hot flashes  Night Sweats  Painful sex and/or dry vagina    The most effective way to treat hot flashes, night sweats and painful dry vagina is with hormone therapy.      There are three categories of hormone therapy    If you are still having periods, and it is safe for you, a low dose birth control pill with both estrogen and progesterone can work very well to provide contraception and relieve symptoms.      Estrogen Therapy or ET  means estrogen only therapy.  Estrogen is the hormone that provides the most menopausal symptom relief.  It is a much lower dose than used in a low dose birth control pill.  Estrogen only therapy is only for women who had had their uterus removed with a surgery.    Estrogen Progesterone Therapy or EPT means taking both estrogen and progesterone.  The progesterone protects the uterus from developing uterine cancer from estrogen alone.  This can be from taking a pill, having a Kyleena or Mirena IUD or using a combined product      There are two basic ways to use hormone therapy:    Systemic which means to circulate in the blood stream to all parts of the body.  This is given either as an oral tablet, a patch, a vaginal ring, or a vaginal cream.  Systemic hormones are used to treat hot flashes and night sweats     Local which means the product only affects a specific or localized area of the body.  This can be given as a cream, a ring or a tablet and is used to treat vaginal symptoms of menopause.   Less risk because the blood level of estrogen and progesterone is not increasing    Montes time for using systemic hormone therapy    You are having hot flashes and/or night sweats  You are within 10 years of menopause and under age 60    Benefits of using systemic hormone therapy may include    Effectively treats hot flashes, night sweats and vaginal dryness and pain  Helps prevent bone loss  May improve mood swings  May improve sex drive or low libido   Helps reduce risk of future cardiovascular disease, type 2 Diabetes, osteoarthritis, and dementia when used within 10 yrs of menopause     Risks of using systemic hormone therapy     Increased risk of developing a clot, rare, and less increase when using a patch  Increased risk of breast cancer, about the same increase in risks as having a glass or two of wine each night or being overweight.  When using Prometrium, the same progesterone hormone the ovary makes, there is less of an increase in risk     Side Effects     You may have vaginal bleeding again initially, if bleeding persists beyond 6 months please let us know so we can screen you for uterine cancer  Breast tenderness     For more information about Menopause    A book Hot and Bothered by Joanne Interiano  A podcast Huyen Cooper 'From PMS to Menopause: How to Hack Your Hormones'   The book The New Rules of Menopause; A [facility] Guide to Perimenopause and Beyond by Dr Nasra Escobar  The North American Menopause Society   The [facility] - Menopause       References:   Menopause Practice; A Clinician's Guide, 6th Edition, The North American Menopause Society   The New Rules of Menopause; A [facility] Guide to Perimenopause and Beyond, Nasra Escobar M.D., M.B.A., Director of [facility]     Compiled by Linda SELBY CNM, MSCP (Menopause Society Certified Practitioner) 1/9/2024

## 2025-02-12 NOTE — PROGRESS NOTES
Itzel is a 49 year old  female who presents for annual exam.     Menses are regular q 28-30 days and normal lasting 4 days.  Menses flow: normal and heavy.  Patient's last menstrual period was 2025 (exact date).. Using none for contraception.  She is not currently considering pregnancy.  Besides routine health maintenance, she has no other health concerns today .  GYNECOLOGIC HISTORY:  Menarche: 13  Age at first intercourse: 17 Number of lifetime partners: 6+  Itzel is sexually active with 1 male partner(s) and is currently in monogamous relationship with .    History sexually transmitted infections:No STD history  STI testing offered?  Declined  ILAN exposure: Unknown  History of abnormal Pap smear: No - age 30-64 HPV with reflex Pap every 5 years recommended  Family history of breast CA: No  Family history of uterine/ovarian CA: No    Family history of colon CA: No    HEALTH MAINTENANCE:  Cholesterol: (  Cholesterol   Date Value Ref Range Status   2023 264 (H) <200 mg/dL Final   2016 186 <200 mg/dL Final    History of abnormal lipids: Yes  Mammo:  . History of abnormal Mammo: No.  Regular Self Breast Exams: No  Calcium/Vitamin D intake: source:  dairy Adequate? Yes  TSH:   TSH   Date Value Ref Range Status   2023 2.23 0.30 - 4.20 uIU/mL Final   2018 2.09 0.40 - 4.00 mU/L Final      Pap;   Lab Results   Component Value Date    PAP NIL 2019    PAP NIL 2018    PAP NIL 10/28/2014        HISTORY:  OB History    Para Term  AB Living   3 3 3 0 0 3   SAB IAB Ectopic Multiple Live Births   0 0 0 0 3      # Outcome Date GA Lbr Nasir/2nd Weight Sex Type Anes PTL Lv   3 Term 08/15/12 39w1d 04:36 / 00:02 2.551 kg (5 lb 10 oz) F Vag-Spont None N AMANDA      Birth Comments: went very fast      Name: Jacqueline      Apgar1: 8  Apgar5: 9   2 Term 12/06/10 39w3d  3.289 kg (7 lb 4 oz) F  EPI  AMANDA      Name: Renita   1 Term 05 38w4d  3.204 kg (7 lb 1 oz) M     AMANDA      Birth Comments: retained placental fragment 4 days after birth      Name: Chris      Obstetric Comments   --Concieved spontanously after 3+ cycles of clomid   --conceived with Letrazole step-up     Past Medical History:   Diagnosis Date    Cervical high risk HPV (human papillomavirus) test positive 2018: NIL pap, + HR HPV (not 16 or 18) result.     PCOS (polycystic ovarian syndrome)      Past Surgical History:   Procedure Laterality Date    D & C  2005    retained placental fragment, removed in ER    HC HYSTEROSALPINGOGRAM  3/5/10    normal     Family History   Problem Relation Age of Onset    Hypertension Father     Lipids Father     Cancer Maternal Grandmother         skin    Osteoporosis Maternal Grandmother     Heart Disease Paternal Grandfather     Cerebrovascular Disease Paternal Grandfather     C.A.D. Paternal Grandfather     Allergies Sister      Social History     Socioeconomic History    Marital status:      Spouse name: None    Number of children: 3    Years of education: None    Highest education level: None   Occupational History     Employer: [employer]   Tobacco Use    Smoking status: Former     Current packs/day: 0.00     Types: Cigarettes     Quit date: 2000     Years since quittin.5    Smokeless tobacco: Never   Substance and Sexual Activity    Alcohol use: Yes     Alcohol/week: 0.0 - 2.0 standard drinks of alcohol     Comment: occ    Drug use: No    Sexual activity: Yes     Partners: Male     Birth control/protection: None     Comment: vasectomy   Other Topics Concern    Parent/sibling w/ CABG, MI or angioplasty before 65F 55M? No       Current Outpatient Medications:     desonide (DESOWEN) 0.05 % external cream, APPLY TO FACE TWICE A DAY, Disp: , Rfl:     fluocinonide (LIDEX) 0.05 % cream, APPLY TWICE DAILY TO ECZEMA. AVOID FACE AND GROIN, Disp: , Rfl: 0    meclizine (ANTIVERT) 25 MG tablet, Take 1-2 tablets (25-50 mg) by mouth 3  times daily as needed for dizziness, Disp: 30 tablet, Rfl: 1    sertraline (ZOLOFT) 100 MG tablet, Take 1 tablet (100 mg) by mouth daily, Disp: 90 tablet, Rfl: 4     Allergies   Allergen Reactions    Shrimp     Cats        Past medical, surgical, social and family history were reviewed and updated in [facility].    ROS:   C:     NEGATIVE for fever, chills, change in weight  I:       NEGATIVE for worrisome rashes, moles or lesions  E:     NEGATIVE for vision changes or irritation  E/M: NEGATIVE for ear, mouth and throat problems  R:     NEGATIVE for significant cough or SOB  CV:   NEGATIVE for chest pain, palpitations or peripheral edema  GI:     NEGATIVE for nausea, abdominal pain, heartburn, or change in bowel habits  :   NEGATIVE for frequency, dysuria, hematuria, vaginal discharge, or irregular bleeding  M:     NEGATIVE for significant arthralgias or myalgia  N:      NEGATIVE for weakness, dizziness or paresthesias  E:      NEGATIVE for temperature intolerance, skin/hair changes  P:      NEGATIVE for changes in mood or affect.    EXAM:  /71 (Patient Position: Sitting)   Pulse 74   Wt 63.1 kg (139 lb 1.6 oz)   LMP 01/18/2025 (Exact Date)   SpO2 100%   BMI 24.34 kg/m     BMI: Body mass index is 24.34 kg/m .  Constitutional: healthy, alert and no distress  Head: Normocephalic. No masses, lesions, tenderness or abnormalities  Neck: Neck supple. Trachea midline. No adenopathy. Thyroid symmetric, normal size.   Cardiovascular: RRR.   Respiratory: Negative.   Breast: No nodularity, asymmetry or nipple discharge bilaterally.  Gastrointestinal: Abdomen soft, non-tender, non-distended. No masses, organomegaly.  : defer  Rectal Exam: deferred  Musculoskeletal: extremities normal  Skin: no suspicious lesions or rashes  Psychiatric: Affect appropriate, cooperative,mentation appears normal.     COUNSELING:   Reviewed preventive health counseling, as reflected in patient instructions       Period control       Regular  exercise       Healthy diet/nutrition       Osteoporosis prevention/bone health       Colorectal Cancer Screening       (Monik)menopause management   reports that she quit smoking about 24 years ago. Her smoking use included cigarettes. She has never used smokeless tobacco.    Body mass index is 24.34 kg/m .    FRAX Risk Assessment    ASSESSMENT:  49 year old female with satisfactory annual exam  1. Encounter for gynecological examination without abnormal finding (Primary)  -doing well with healthy choices and activity- notes it is harder to lose weight now where normally she was able to eat whatever and work it off  -periods regular but heavier- notes many years of no/minimal period due to PCOS  -noting some feelings of perimenopause- low energy, weight gain, night sweats- discussed overall menopause sx, solutions, period control and solutions  -discussed making sure thorough investigation to make sure medical dx not being missed  -pap due July- come in to office- fasting lipids for heart health  -sleep hygiene  -strength training   - MA Screen Bilateral w/Dimas; Future  - CBC with platelets; Future  - Hemoglobin A1c; Future  - TSH with free T4 reflex; Future    2. Encounter for screening mammogram for breast cancer  -discussed how to schedule  -no family hx  - MA Screen Bilateral w/Dimas; Future    3. Anxiety  -feeling well on current dose- no concerns full refil  - sertraline (ZOLOFT) 100 MG tablet; Take 1 tablet (100 mg) by mouth daily.  Dispense: 90 tablet; Refill: 4    4. Need for prophylactic vaccination and inoculation against influenza  - INFLUENZA VACCINE,SPLIT VIRUS,TRIVALENT,PF(FLUZONE)    5. High priority for 2019-nCoV vaccine  - COVID-19 12+ (PFIZER)    Answers submitted by the patient for this visit:  Patient Health Questionnaire (Submitted on 2/12/2025)  If you checked off any problems, how difficult have these problems made it for you to do your work, take care of things at home, or get along with  other people?: Not difficult at all  PHQ9 TOTAL SCORE: 1    Rtc July for pap/fasted labs and PRN at anytime for perimeno/juan solutions  BAL Garcia CNP

## 2025-02-13 ENCOUNTER — PATIENT OUTREACH (OUTPATIENT)
Dept: CARE COORDINATION | Facility: CLINIC | Age: 50
End: 2025-02-13
Payer: COMMERCIAL

## 2025-02-26 ENCOUNTER — HOSPITAL ENCOUNTER (OUTPATIENT)
Dept: MAMMOGRAPHY | Facility: CLINIC | Age: 50
Discharge: HOME | End: 2025-02-26
Payer: COMMERCIAL

## 2025-02-26 DIAGNOSIS — Z01.419 ENCOUNTER FOR GYNECOLOGICAL EXAMINATION WITHOUT ABNORMAL FINDING: ICD-10-CM

## 2025-02-26 DIAGNOSIS — Z12.31 ENCOUNTER FOR SCREENING MAMMOGRAM FOR BREAST CANCER: ICD-10-CM

## 2025-02-26 PROCEDURE — 77063 BREAST TOMOSYNTHESIS BI: CPT

## 2025-07-16 ENCOUNTER — OFFICE VISIT (OUTPATIENT)
Dept: OBGYN | Facility: CLINIC | Age: 50
End: 2025-07-16
Payer: COMMERCIAL

## 2025-07-16 VITALS
HEART RATE: 68 BPM | DIASTOLIC BLOOD PRESSURE: 74 MMHG | OXYGEN SATURATION: 97 % | BODY MASS INDEX: 24.43 KG/M2 | SYSTOLIC BLOOD PRESSURE: 113 MMHG | WEIGHT: 139.6 LBS

## 2025-07-16 DIAGNOSIS — N92.0 MENORRHAGIA WITH REGULAR CYCLE: ICD-10-CM

## 2025-07-16 DIAGNOSIS — N95.1 PERIMENOPAUSAL SYMPTOMS: ICD-10-CM

## 2025-07-16 DIAGNOSIS — Z12.4 SCREENING FOR CERVICAL CANCER: Primary | ICD-10-CM

## 2025-07-16 DIAGNOSIS — Z01.419 ENCOUNTER FOR GYNECOLOGICAL EXAMINATION WITHOUT ABNORMAL FINDING: ICD-10-CM

## 2025-07-16 LAB
CHOLEST SERPL-MCNC: 259 MG/DL
ERYTHROCYTE [DISTWIDTH] IN BLOOD BY AUTOMATED COUNT: 12.4 % (ref 10–15)
EST. AVERAGE GLUCOSE BLD GHB EST-MCNC: 103 MG/DL
FASTING STATUS PATIENT QL REPORTED: YES
HBA1C MFR BLD: 5.2 % (ref 0–5.6)
HCT VFR BLD AUTO: 39.4 % (ref 35–47)
HDLC SERPL-MCNC: 61 MG/DL
HGB BLD-MCNC: 12.9 G/DL (ref 11.7–15.7)
LDLC SERPL CALC-MCNC: 169 MG/DL
MCH RBC QN AUTO: 27.9 PG (ref 26.5–33)
MCHC RBC AUTO-ENTMCNC: 32.7 G/DL (ref 31.5–36.5)
MCV RBC AUTO: 85 FL (ref 78–100)
NONHDLC SERPL-MCNC: 198 MG/DL
PLATELET # BLD AUTO: 273 10E3/UL (ref 150–450)
RBC # BLD AUTO: 4.62 10E6/UL (ref 3.8–5.2)
TRIGL SERPL-MCNC: 146 MG/DL
TSH SERPL DL<=0.005 MIU/L-ACNC: 2.68 UIU/ML (ref 0.3–4.2)
WBC # BLD AUTO: 4.4 10E3/UL (ref 4–11)

## 2025-07-16 PROCEDURE — 80061 LIPID PANEL: CPT

## 2025-07-16 PROCEDURE — 36415 COLL VENOUS BLD VENIPUNCTURE: CPT

## 2025-07-16 PROCEDURE — 85027 COMPLETE CBC AUTOMATED: CPT

## 2025-07-16 PROCEDURE — 84443 ASSAY THYROID STIM HORMONE: CPT

## 2025-07-16 PROCEDURE — 83036 HEMOGLOBIN GLYCOSYLATED A1C: CPT

## 2025-07-16 RX ORDER — NORGESTIMATE AND ETHINYL ESTRADIOL 0.25-0.035
1 KIT ORAL DAILY
Qty: 112 TABLET | Refills: 3 | Status: SHIPPED | OUTPATIENT
Start: 2025-07-16

## 2025-07-16 NOTE — PROGRESS NOTES
CC: pap screen, fasted labs, sx question  S: Itzel is a 50 yo  here today to update pap screen, complete fasted labs and ask a question about symptoms  -periods heavy- still present  -fasting today for labs  -feeling not like herself- perimenopause affecting mood, weight, heavy periods -frustrating  -of note her 20 mo older sister diagnosed with breast cancer unilateral mastectomy doing well- Itzel had updated mammo 2025- unaware of type, or hereditary testing done will ask her sister    O:/74 (BP Location: Right arm, Patient Position: Sitting, Cuff Size: Adult Regular)   Pulse 68   Wt 63.3 kg (139 lb 9.6 oz)   LMP 2025   SpO2 97%   BMI 24.43 kg/m    Current Outpatient Medications   Medication Sig Dispense Refill    desonide (DESOWEN) 0.05 % external cream APPLY TO FACE TWICE A DAY      fluocinonide (LIDEX) 0.05 % cream APPLY TWICE DAILY TO ECZEMA. AVOID FACE AND GROIN  0    meclizine (ANTIVERT) 25 MG tablet Take 1-2 tablets (25-50 mg) by mouth 3 times daily as needed for dizziness 30 tablet 1    norgestimate-ethinyl estradiol (ORTHO-CYCLEN) 0.25-35 MG-MCG tablet Take 1 tablet by mouth daily. Take continuously to prevent menses and symptoms 112 tablet 3    sertraline (ZOLOFT) 100 MG tablet Take 1 tablet (100 mg) by mouth daily. 90 tablet 4     No current facility-administered medications for this visit.        Allergies   Allergen Reactions    Shrimp     Cats      Pelvic Exam:  Vulva: No external lesions, normal hair distribution, no adenopathy  Vagina: Moist, pale, no abnormal discharge, smooth, no lesions  Cervix: Pap smear is taken, parous, smooth, pink, no visible lesions  Uterus: Normal size, anteverted, non-tender, mobile  Ovaries: No mass, non-tender, mobile  Rectal exam: external appearance normal     A/P:  Itzel is a 50 yo  here today to update pap screen, complete fasted labs and ask a question about symptoms    1. Screening for cervical cancer (Primary)  - HPV and Gynecologic  Cytology Panel - Recommended Age 30-65 Years    2. Encounter for gynecological examination without abnormal finding  -fasted labs  -mammo q February new family hx breast cancer  - Lipid panel reflex to direct LDL Fasting; Future  - CBC with platelets  - Hemoglobin A1c  - TSH with free T4 reflex  - Lipid panel reflex to direct LDL Fasting    3. Menorrhagia with regular cycle  -no htn, vte, tobacco or migraine with aura- can take jordan until 57   - norgestimate-ethinyl estradiol (ORTHO-CYCLEN) 0.25-35 MG-MCG tablet; Take 1 tablet by mouth daily. Take continuously to prevent menses and symptoms  Dispense: 112 tablet; Refill: 3    4. Perimenopausal symptoms  -does not want Iud at this point does not feel like peroids that problematic  -discussed jordan can give hormones- control period and offer some sx relief  -ins does not cover nextstellis or lolo  -sx check 2 mo if not liking or not giving sx relief we can switch to MHT  -no vaginal sx- add tve PRN  - norgestimate-ethinyl estradiol (ORTHO-CYCLEN) 0.25-35 MG-MCG tablet; Take 1 tablet by mouth daily. Take continuously to prevent menses and symptoms  Dispense: 112 tablet; Refill: 3    Rtc 2 mo if not feeling improvement in sx  BAL Garcia CNP

## 2025-07-24 LAB
BKR AP ASSOCIATED HPV REPORT: ABNORMAL
BKR LAB AP GYN ADEQUACY: ABNORMAL
BKR LAB AP GYN INTERPRETATION: ABNORMAL
BKR LAB AP GYN OTHER FINDINGS: ABNORMAL
BKR LAB AP LMP: ABNORMAL
BKR LAB AP PREVIOUS ABNORMAL: ABNORMAL
PATH REPORT.COMMENTS IMP SPEC: ABNORMAL
PATH REPORT.COMMENTS IMP SPEC: ABNORMAL
PATH REPORT.RELEVANT HX SPEC: ABNORMAL

## 2025-08-16 ENCOUNTER — HOSPITAL ENCOUNTER (EMERGENCY)
Facility: CLINIC | Age: 50
Discharge: HOME | End: 2025-08-17
Attending: EMERGENCY MEDICINE | Admitting: EMERGENCY MEDICINE
Payer: COMMERCIAL

## 2025-08-16 DIAGNOSIS — R89.2 DRUG TOXICITY: Primary | ICD-10-CM

## 2025-08-16 DIAGNOSIS — F23 ACUTE PSYCHOSIS (H): ICD-10-CM

## 2025-08-16 LAB
AMPHETAMINES UR QL SCN: ABNORMAL
ANION GAP SERPL CALCULATED.3IONS-SCNC: 18 MMOL/L (ref 7–15)
BARBITURATES UR QL SCN: ABNORMAL
BENZODIAZ UR QL SCN: ABNORMAL
BUN SERPL-MCNC: 15.9 MG/DL (ref 6–20)
BZE UR QL SCN: ABNORMAL
CALCIUM SERPL-MCNC: 9.1 MG/DL (ref 8.8–10.4)
CANNABINOIDS UR QL SCN: ABNORMAL
CHLORIDE SERPL-SCNC: 101 MMOL/L (ref 98–107)
CREAT SERPL-MCNC: 0.77 MG/DL (ref 0.51–0.95)
EGFRCR SERPLBLD CKD-EPI 2021: >90 ML/MIN/1.73M2
ERYTHROCYTE [DISTWIDTH] IN BLOOD BY AUTOMATED COUNT: 12.6 % (ref 10–15)
FENTANYL UR QL: ABNORMAL
GLUCOSE SERPL-MCNC: 187 MG/DL (ref 70–99)
HCO3 SERPL-SCNC: 18 MMOL/L (ref 22–29)
HCT VFR BLD AUTO: 37 % (ref 35–47)
HGB BLD-MCNC: 12.8 G/DL (ref 11.7–15.7)
MCH RBC QN AUTO: 29.2 PG (ref 26.5–33)
MCHC RBC AUTO-ENTMCNC: 34.6 G/DL (ref 31.5–36.5)
MCV RBC AUTO: 84.5 FL (ref 78–100)
OPIATES UR QL SCN: ABNORMAL
PCP QUAL URINE (ROCHE): ABNORMAL
PLATELET # BLD AUTO: 251 10E3/UL (ref 150–450)
POTASSIUM SERPL-SCNC: 3.2 MMOL/L (ref 3.4–5.3)
RBC # BLD AUTO: 4.38 10E6/UL (ref 3.8–5.2)
SODIUM SERPL-SCNC: 137 MMOL/L (ref 135–145)
WBC # BLD AUTO: 14.4 10E3/UL (ref 4–11)

## 2025-08-16 PROCEDURE — 250N000013 HC RX MED GY IP 250 OP 250 PS 637: Performed by: EMERGENCY MEDICINE

## 2025-08-16 PROCEDURE — 96372 THER/PROPH/DIAG INJ SC/IM: CPT | Performed by: EMERGENCY MEDICINE

## 2025-08-16 PROCEDURE — 80307 DRUG TEST PRSMV CHEM ANLYZR: CPT | Performed by: EMERGENCY MEDICINE

## 2025-08-16 PROCEDURE — 99285 EMERGENCY DEPT VISIT HI MDM: CPT | Performed by: EMERGENCY MEDICINE

## 2025-08-16 PROCEDURE — 250N000011 HC RX IP 250 OP 636: Performed by: EMERGENCY MEDICINE

## 2025-08-16 PROCEDURE — 85014 HEMATOCRIT: CPT | Performed by: EMERGENCY MEDICINE

## 2025-08-16 PROCEDURE — 36415 COLL VENOUS BLD VENIPUNCTURE: CPT | Performed by: EMERGENCY MEDICINE

## 2025-08-16 PROCEDURE — 250N000009 HC RX 250: Performed by: EMERGENCY MEDICINE

## 2025-08-16 PROCEDURE — 80048 BASIC METABOLIC PNL TOTAL CA: CPT | Performed by: EMERGENCY MEDICINE

## 2025-08-16 RX ORDER — WATER 10 ML/10ML
INJECTION INTRAMUSCULAR; INTRAVENOUS; SUBCUTANEOUS
Status: COMPLETED
Start: 2025-08-16 | End: 2025-08-16

## 2025-08-16 RX ORDER — OLANZAPINE 5 MG/1
5 TABLET, ORALLY DISINTEGRATING ORAL 3 TIMES DAILY PRN
Status: DISCONTINUED | OUTPATIENT
Start: 2025-08-16 | End: 2025-08-17 | Stop reason: HOSPADM

## 2025-08-16 RX ORDER — LORAZEPAM 1 MG/1
1 TABLET ORAL ONCE
Status: COMPLETED | OUTPATIENT
Start: 2025-08-16 | End: 2025-08-16

## 2025-08-16 RX ORDER — OLANZAPINE 10 MG/2ML
10 INJECTION, POWDER, FOR SOLUTION INTRAMUSCULAR ONCE
Status: COMPLETED | OUTPATIENT
Start: 2025-08-16 | End: 2025-08-16

## 2025-08-16 RX ADMIN — OLANZAPINE 10 MG: 10 INJECTION, POWDER, FOR SOLUTION INTRAMUSCULAR at 21:31

## 2025-08-16 RX ADMIN — WATER 10 ML: 1 INJECTION INTRAMUSCULAR; INTRAVENOUS; SUBCUTANEOUS at 21:31

## 2025-08-16 RX ADMIN — LORAZEPAM 1 MG: 1 TABLET ORAL at 22:37

## 2025-08-16 ASSESSMENT — ACTIVITIES OF DAILY LIVING (ADL)
ADLS_ACUITY_SCORE: 41

## 2025-08-17 VITALS
HEIGHT: 63 IN | HEART RATE: 71 BPM | WEIGHT: 140 LBS | RESPIRATION RATE: 18 BRPM | TEMPERATURE: 97.5 F | BODY MASS INDEX: 24.8 KG/M2 | SYSTOLIC BLOOD PRESSURE: 101 MMHG | OXYGEN SATURATION: 95 % | DIASTOLIC BLOOD PRESSURE: 59 MMHG

## 2025-08-17 PROBLEM — F41.1 GENERALIZED ANXIETY DISORDER: Status: ACTIVE | Noted: 2025-08-17

## 2025-08-17 PROBLEM — F32.0 CURRENT MILD EPISODE OF MAJOR DEPRESSIVE DISORDER, UNSPECIFIED WHETHER RECURRENT: Status: ACTIVE | Noted: 2025-08-17

## 2025-08-17 ASSESSMENT — ACTIVITIES OF DAILY LIVING (ADL)
ADLS_ACUITY_SCORE: 41

## 2025-08-17 ASSESSMENT — COLUMBIA-SUICIDE SEVERITY RATING SCALE - C-SSRS
1. IN THE PAST MONTH, HAVE YOU WISHED YOU WERE DEAD OR WISHED YOU COULD GO TO SLEEP AND NOT WAKE UP?: NO
6. HAVE YOU EVER DONE ANYTHING, STARTED TO DO ANYTHING, OR PREPARED TO DO ANYTHING TO END YOUR LIFE?: NO
2. HAVE YOU ACTUALLY HAD ANY THOUGHTS OF KILLING YOURSELF IN THE PAST MONTH?: NO

## 2025-08-21 ENCOUNTER — TELEPHONE (OUTPATIENT)
Dept: BEHAVIORAL HEALTH | Facility: CLINIC | Age: 50
End: 2025-08-21
Payer: COMMERCIAL

## 2025-08-22 ENCOUNTER — TELEPHONE (OUTPATIENT)
Dept: OBGYN | Facility: CLINIC | Age: 50
End: 2025-08-22
Payer: COMMERCIAL